# Patient Record
Sex: MALE | Race: WHITE | NOT HISPANIC OR LATINO | URBAN - METROPOLITAN AREA
[De-identification: names, ages, dates, MRNs, and addresses within clinical notes are randomized per-mention and may not be internally consistent; named-entity substitution may affect disease eponyms.]

---

## 2017-01-01 ENCOUNTER — COMMUNICATION - HEALTHEAST (OUTPATIENT)
Dept: CARDIOLOGY | Facility: CLINIC | Age: 72
End: 2017-01-01

## 2017-01-01 ENCOUNTER — AMBULATORY - HEALTHEAST (OUTPATIENT)
Dept: CARDIOLOGY | Facility: CLINIC | Age: 72
End: 2017-01-01

## 2017-01-01 DIAGNOSIS — Z95.0 CARDIAC PACEMAKER IN SITU: ICD-10-CM

## 2017-01-01 LAB
HCC DEVICE COMMENTS: NORMAL
HCC DEVICE COMMENTS: NORMAL

## 2017-05-10 ASSESSMENT — MIFFLIN-ST. JEOR: SCORE: 2057.13

## 2017-05-12 ASSESSMENT — MIFFLIN-ST. JEOR: SCORE: 2047.6

## 2017-05-13 ASSESSMENT — MIFFLIN-ST. JEOR: SCORE: 2032.18

## 2017-05-14 ASSESSMENT — MIFFLIN-ST. JEOR: SCORE: 2023.11

## 2017-05-15 ENCOUNTER — SURGERY - HEALTHEAST (OUTPATIENT)
Dept: CARDIOLOGY | Facility: CLINIC | Age: 72
End: 2017-05-15

## 2017-05-15 ASSESSMENT — MIFFLIN-ST. JEOR: SCORE: 2026.74

## 2017-05-16 ASSESSMENT — MIFFLIN-ST. JEOR
SCORE: 2050.78
SCORE: 2034.45

## 2017-05-19 ENCOUNTER — RECORDS - HEALTHEAST (OUTPATIENT)
Dept: LAB | Facility: CLINIC | Age: 72
End: 2017-05-19

## 2017-05-19 LAB
CHOLEST SERPL-MCNC: 157 MG/DL
FASTING STATUS PATIENT QL REPORTED: ABNORMAL
HDLC SERPL-MCNC: 32 MG/DL
LDLC SERPL CALC-MCNC: 106 MG/DL
TRIGL SERPL-MCNC: 94 MG/DL

## 2017-05-22 ENCOUNTER — AMBULATORY - HEALTHEAST (OUTPATIENT)
Dept: CARDIOLOGY | Facility: CLINIC | Age: 72
End: 2017-05-22

## 2017-05-22 DIAGNOSIS — Z95.0 CARDIAC PACEMAKER IN SITU: ICD-10-CM

## 2017-05-22 LAB — HCC DEVICE COMMENTS: NORMAL

## 2017-05-22 ASSESSMENT — MIFFLIN-ST. JEOR: SCORE: 2049.42

## 2017-06-02 ENCOUNTER — AMBULATORY - HEALTHEAST (OUTPATIENT)
Dept: CARDIOLOGY | Facility: CLINIC | Age: 72
End: 2017-06-02

## 2017-06-02 ENCOUNTER — COMMUNICATION - HEALTHEAST (OUTPATIENT)
Dept: CARDIOLOGY | Facility: CLINIC | Age: 72
End: 2017-06-02

## 2017-06-02 DIAGNOSIS — I48.91 ATRIAL FIBRILLATION (H): ICD-10-CM

## 2017-06-02 DIAGNOSIS — Z95.0 CARDIAC PACEMAKER IN SITU: ICD-10-CM

## 2017-06-03 LAB — HCC DEVICE COMMENTS: NORMAL

## 2017-06-23 ENCOUNTER — AMBULATORY - HEALTHEAST (OUTPATIENT)
Dept: CARDIOLOGY | Facility: CLINIC | Age: 72
End: 2017-06-23

## 2017-06-23 DIAGNOSIS — I49.5 SSS (SICK SINUS SYNDROME) (H): ICD-10-CM

## 2017-06-23 DIAGNOSIS — I48.91 ATRIAL FIBRILLATION WITH RVR (H): ICD-10-CM

## 2017-06-23 DIAGNOSIS — I45.5 SINUS PAUSE: ICD-10-CM

## 2017-06-23 DIAGNOSIS — Z95.0 CARDIAC PACEMAKER IN SITU: ICD-10-CM

## 2017-06-23 DIAGNOSIS — R55 SYNCOPE AND COLLAPSE: ICD-10-CM

## 2017-06-23 LAB — HCC DEVICE COMMENTS: NORMAL

## 2017-06-23 ASSESSMENT — MIFFLIN-ST. JEOR: SCORE: 2058.49

## 2017-07-05 ENCOUNTER — AMBULATORY - HEALTHEAST (OUTPATIENT)
Dept: CARDIOLOGY | Facility: CLINIC | Age: 72
End: 2017-07-05

## 2017-07-05 DIAGNOSIS — Z95.0 CARDIAC PACEMAKER IN SITU: ICD-10-CM

## 2017-07-05 LAB — HCC DEVICE COMMENTS: NORMAL

## 2017-07-25 ENCOUNTER — AMBULATORY - HEALTHEAST (OUTPATIENT)
Dept: CARDIOLOGY | Facility: CLINIC | Age: 72
End: 2017-07-25

## 2017-07-25 DIAGNOSIS — Z95.0 CARDIAC PACEMAKER IN SITU: ICD-10-CM

## 2017-07-25 LAB — HCC DEVICE COMMENTS: NORMAL

## 2017-07-25 ASSESSMENT — MIFFLIN-ST. JEOR: SCORE: 2036.26

## 2017-07-28 ENCOUNTER — OFFICE VISIT - HEALTHEAST (OUTPATIENT)
Dept: SLEEP MEDICINE | Facility: CLINIC | Age: 72
End: 2017-07-28

## 2017-07-28 DIAGNOSIS — G47.8 SLEEP DYSFUNCTION WITH SLEEP STAGE DISTURBANCE: ICD-10-CM

## 2017-07-28 DIAGNOSIS — R06.83 SNORING: ICD-10-CM

## 2017-07-28 DIAGNOSIS — R29.818 SUSPECTED SLEEP APNEA: ICD-10-CM

## 2017-07-28 DIAGNOSIS — G47.10 HYPERSOMNIA, UNSPECIFIED: ICD-10-CM

## 2017-07-28 ASSESSMENT — MIFFLIN-ST. JEOR: SCORE: 2035.81

## 2017-08-15 ENCOUNTER — RECORDS - HEALTHEAST (OUTPATIENT)
Dept: SLEEP MEDICINE | Facility: CLINIC | Age: 72
End: 2017-08-15

## 2017-08-15 ENCOUNTER — RECORDS - HEALTHEAST (OUTPATIENT)
Dept: ADMINISTRATIVE | Facility: OTHER | Age: 72
End: 2017-08-15

## 2017-08-15 DIAGNOSIS — G47.10 HYPERSOMNIA, UNSPECIFIED: ICD-10-CM

## 2017-08-15 DIAGNOSIS — G47.30 SLEEP APNEA, UNSPECIFIED: ICD-10-CM

## 2017-08-15 DIAGNOSIS — R06.83 SNORING: ICD-10-CM

## 2017-08-15 DIAGNOSIS — G47.8 OTHER SLEEP DISORDERS: ICD-10-CM

## 2017-08-16 ENCOUNTER — RECORDS - HEALTHEAST (OUTPATIENT)
Dept: ADMINISTRATIVE | Facility: OTHER | Age: 72
End: 2017-08-16

## 2017-08-22 ENCOUNTER — COMMUNICATION - HEALTHEAST (OUTPATIENT)
Dept: SLEEP MEDICINE | Facility: CLINIC | Age: 72
End: 2017-08-22

## 2017-09-13 ENCOUNTER — OFFICE VISIT - HEALTHEAST (OUTPATIENT)
Dept: SLEEP MEDICINE | Facility: CLINIC | Age: 72
End: 2017-09-13

## 2017-09-13 DIAGNOSIS — G47.8 SLEEP DYSFUNCTION WITH SLEEP STAGE DISTURBANCE: ICD-10-CM

## 2017-09-13 DIAGNOSIS — G47.10 HYPERSOMNIA, UNSPECIFIED: ICD-10-CM

## 2017-09-13 DIAGNOSIS — G47.69 SLEEP-RELATED MOVEMENT DISORDER: ICD-10-CM

## 2017-09-13 DIAGNOSIS — G47.8 UPPER AIRWAY RESISTANCE SYNDROME: ICD-10-CM

## 2017-09-13 ASSESSMENT — MIFFLIN-ST. JEOR: SCORE: 2062.11

## 2017-10-31 ENCOUNTER — AMBULATORY - HEALTHEAST (OUTPATIENT)
Dept: CARDIOLOGY | Facility: CLINIC | Age: 72
End: 2017-10-31

## 2017-10-31 DIAGNOSIS — Z95.0 CARDIAC PACEMAKER IN SITU: ICD-10-CM

## 2017-10-31 LAB — HCC DEVICE COMMENTS: NORMAL

## 2018-01-01 ENCOUNTER — ANESTHESIA - HEALTHEAST (OUTPATIENT)
Dept: INTENSIVE CARE | Facility: HOSPITAL | Age: 73
End: 2018-01-01

## 2018-01-01 ENCOUNTER — OFFICE VISIT - HEALTHEAST (OUTPATIENT)
Dept: GERIATRICS | Facility: CLINIC | Age: 73
End: 2018-01-01

## 2018-01-01 ENCOUNTER — RECORDS - HEALTHEAST (OUTPATIENT)
Dept: LAB | Facility: CLINIC | Age: 73
End: 2018-01-01

## 2018-01-01 ENCOUNTER — AMBULATORY - HEALTHEAST (OUTPATIENT)
Dept: CARDIOLOGY | Facility: CLINIC | Age: 73
End: 2018-01-01

## 2018-01-01 ENCOUNTER — SURGERY - HEALTHEAST (OUTPATIENT)
Dept: GASTROENTEROLOGY | Facility: HOSPITAL | Age: 73
End: 2018-01-01

## 2018-01-01 ENCOUNTER — SURGERY - HEALTHEAST (OUTPATIENT)
Dept: SURGERY | Facility: CLINIC | Age: 73
End: 2018-01-01

## 2018-01-01 ENCOUNTER — RECORDS - HEALTHEAST (OUTPATIENT)
Dept: ADMINISTRATIVE | Facility: OTHER | Age: 73
End: 2018-01-01

## 2018-01-01 ENCOUNTER — HOME CARE/HOSPICE - HEALTHEAST (OUTPATIENT)
Dept: HOME HEALTH SERVICES | Facility: HOME HEALTH | Age: 73
End: 2018-01-01

## 2018-01-01 ENCOUNTER — COMMUNICATION - HEALTHEAST (OUTPATIENT)
Dept: GERIATRICS | Facility: CLINIC | Age: 73
End: 2018-01-01

## 2018-01-01 ENCOUNTER — ANESTHESIA - HEALTHEAST (OUTPATIENT)
Dept: SURGERY | Facility: CLINIC | Age: 73
End: 2018-01-01

## 2018-01-01 ENCOUNTER — ANESTHESIA - HEALTHEAST (OUTPATIENT)
Dept: SURGERY | Facility: HOSPITAL | Age: 73
End: 2018-01-01

## 2018-01-01 ENCOUNTER — RECORDS - HEALTHEAST (OUTPATIENT)
Dept: LAB | Facility: HOSPITAL | Age: 73
End: 2018-01-01

## 2018-01-01 ENCOUNTER — COMMUNICATION - HEALTHEAST (OUTPATIENT)
Dept: CARDIOLOGY | Facility: CLINIC | Age: 73
End: 2018-01-01

## 2018-01-01 ENCOUNTER — AMBULATORY - HEALTHEAST (OUTPATIENT)
Dept: SURGERY | Facility: CLINIC | Age: 73
End: 2018-01-01

## 2018-01-01 ENCOUNTER — RECORDS - HEALTHEAST (OUTPATIENT)
Dept: INTENSIVE CARE | Facility: HOSPITAL | Age: 73
End: 2018-01-01

## 2018-01-01 ENCOUNTER — SURGERY - HEALTHEAST (OUTPATIENT)
Dept: SURGERY | Facility: HOSPITAL | Age: 73
End: 2018-01-01

## 2018-01-01 ENCOUNTER — AMBULATORY - HEALTHEAST (OUTPATIENT)
Dept: GERIATRICS | Facility: CLINIC | Age: 73
End: 2018-01-01

## 2018-01-01 DIAGNOSIS — Z87.81 S/P ORIF (OPEN REDUCTION INTERNAL FIXATION) FRACTURE: ICD-10-CM

## 2018-01-01 DIAGNOSIS — I89.0 LYMPHEDEMA: ICD-10-CM

## 2018-01-01 DIAGNOSIS — Z95.0 CARDIAC PACEMAKER IN SITU: ICD-10-CM

## 2018-01-01 DIAGNOSIS — Z98.890 S/P ORIF (OPEN REDUCTION INTERNAL FIXATION) FRACTURE: ICD-10-CM

## 2018-01-01 DIAGNOSIS — I48.91 ATRIAL FIBRILLATION (H): ICD-10-CM

## 2018-01-01 DIAGNOSIS — R52 PAIN MANAGEMENT: ICD-10-CM

## 2018-01-01 DIAGNOSIS — S42.309A HUMERAL FRACTURE: ICD-10-CM

## 2018-01-01 DIAGNOSIS — R00.1 SINUS BRADYCARDIA: ICD-10-CM

## 2018-01-01 DIAGNOSIS — N17.9 ACUTE KIDNEY INJURY (H): ICD-10-CM

## 2018-01-01 DIAGNOSIS — T14.8XXA WOUND INFECTION: ICD-10-CM

## 2018-01-01 DIAGNOSIS — K76.82 HEPATIC ENCEPHALOPATHY (H): ICD-10-CM

## 2018-01-01 DIAGNOSIS — I48.91 ATRIAL FIBRILLATION WITH RVR (H): ICD-10-CM

## 2018-01-01 DIAGNOSIS — E11.9 DIABETES MELLITUS (H): ICD-10-CM

## 2018-01-01 DIAGNOSIS — I10 HYPERTENSION: ICD-10-CM

## 2018-01-01 DIAGNOSIS — D50.0 BLOOD LOSS ANEMIA: ICD-10-CM

## 2018-01-01 DIAGNOSIS — E66.9 OBESE: ICD-10-CM

## 2018-01-01 DIAGNOSIS — I50.9 CHF (CONGESTIVE HEART FAILURE) (H): ICD-10-CM

## 2018-01-01 DIAGNOSIS — L08.9 WOUND INFECTION: ICD-10-CM

## 2018-01-01 DIAGNOSIS — T14.8XXA WOUND DRAINAGE: ICD-10-CM

## 2018-01-01 DIAGNOSIS — I49.5 SSS (SICK SINUS SYNDROME) (H): ICD-10-CM

## 2018-01-01 DIAGNOSIS — R10.31 RIGHT GROIN PAIN: ICD-10-CM

## 2018-01-01 DIAGNOSIS — I49.5 TACHY-BRADY SYNDROME (H): ICD-10-CM

## 2018-01-01 DIAGNOSIS — S42.201A FRACTURE OF PROXIMAL END OF RIGHT HUMERUS: ICD-10-CM

## 2018-01-01 LAB
ALBUMIN SERPL-MCNC: 3.1 G/DL (ref 3.5–5)
ALP SERPL-CCNC: 170 U/L (ref 45–120)
ALT SERPL W P-5'-P-CCNC: 25 U/L (ref 0–45)
ANION GAP SERPL CALCULATED.3IONS-SCNC: 11 MMOL/L (ref 5–18)
ANION GAP SERPL CALCULATED.3IONS-SCNC: 14 MMOL/L (ref 5–18)
ANION GAP SERPL CALCULATED.3IONS-SCNC: 5 MMOL/L (ref 5–18)
ANION GAP SERPL CALCULATED.3IONS-SCNC: 6 MMOL/L (ref 5–18)
ANION GAP SERPL CALCULATED.3IONS-SCNC: 6 MMOL/L (ref 5–18)
ANION GAP SERPL CALCULATED.3IONS-SCNC: 7 MMOL/L (ref 5–18)
ANION GAP SERPL CALCULATED.3IONS-SCNC: 7 MMOL/L (ref 5–18)
ANION GAP SERPL CALCULATED.3IONS-SCNC: 8 MMOL/L (ref 5–18)
ANION GAP SERPL CALCULATED.3IONS-SCNC: 8 MMOL/L (ref 5–18)
AST SERPL W P-5'-P-CCNC: 51 U/L (ref 0–40)
ATRIAL RATE - MUSE: 66 BPM
BACTERIA SPEC CULT: ABNORMAL
BACTERIA SPEC CULT: ABNORMAL
BASOPHILS # BLD AUTO: 0 THOU/UL (ref 0–0.2)
BASOPHILS NFR BLD AUTO: 0 % (ref 0–2)
BASOPHILS NFR BLD AUTO: 1 % (ref 0–2)
BASOPHILS NFR BLD AUTO: 1 % (ref 0–2)
BILIRUB SERPL-MCNC: 2.2 MG/DL (ref 0–1)
BUN SERPL-MCNC: 12 MG/DL (ref 8–28)
BUN SERPL-MCNC: 14 MG/DL (ref 8–28)
BUN SERPL-MCNC: 14 MG/DL (ref 8–28)
BUN SERPL-MCNC: 15 MG/DL (ref 8–28)
BUN SERPL-MCNC: 15 MG/DL (ref 8–28)
BUN SERPL-MCNC: 17 MG/DL (ref 8–28)
BUN SERPL-MCNC: 17 MG/DL (ref 8–28)
BUN SERPL-MCNC: 19 MG/DL (ref 8–28)
BUN SERPL-MCNC: 19 MG/DL (ref 8–28)
C REACTIVE PROTEIN LHE: 0.5 MG/DL (ref 0–0.8)
C REACTIVE PROTEIN LHE: 1.3 MG/DL (ref 0–0.8)
CALCIUM SERPL-MCNC: 8.7 MG/DL (ref 8.5–10.5)
CALCIUM SERPL-MCNC: 8.7 MG/DL (ref 8.5–10.5)
CALCIUM SERPL-MCNC: 8.8 MG/DL (ref 8.5–10.5)
CALCIUM SERPL-MCNC: 8.9 MG/DL (ref 8.5–10.5)
CALCIUM SERPL-MCNC: 9.1 MG/DL (ref 8.5–10.5)
CALCIUM SERPL-MCNC: 9.4 MG/DL (ref 8.5–10.5)
CALCIUM SERPL-MCNC: 9.5 MG/DL (ref 8.5–10.5)
CHLORIDE BLD-SCNC: 101 MMOL/L (ref 98–107)
CHLORIDE BLD-SCNC: 103 MMOL/L (ref 98–107)
CHLORIDE BLD-SCNC: 104 MMOL/L (ref 98–107)
CHLORIDE BLD-SCNC: 104 MMOL/L (ref 98–107)
CHLORIDE BLD-SCNC: 105 MMOL/L (ref 98–107)
CHLORIDE BLD-SCNC: 105 MMOL/L (ref 98–107)
CHLORIDE BLD-SCNC: 107 MMOL/L (ref 98–107)
CHLORIDE BLD-SCNC: 111 MMOL/L (ref 98–107)
CHLORIDE BLD-SCNC: 99 MMOL/L (ref 98–107)
CO2 SERPL-SCNC: 17 MMOL/L (ref 22–31)
CO2 SERPL-SCNC: 24 MMOL/L (ref 22–31)
CO2 SERPL-SCNC: 26 MMOL/L (ref 22–31)
CO2 SERPL-SCNC: 28 MMOL/L (ref 22–31)
CO2 SERPL-SCNC: 29 MMOL/L (ref 22–31)
CO2 SERPL-SCNC: 30 MMOL/L (ref 22–31)
CO2 SERPL-SCNC: 31 MMOL/L (ref 22–31)
CREAT SERPL-MCNC: 0.68 MG/DL (ref 0.7–1.3)
CREAT SERPL-MCNC: 0.73 MG/DL (ref 0.7–1.3)
CREAT SERPL-MCNC: 0.76 MG/DL (ref 0.7–1.3)
CREAT SERPL-MCNC: 0.76 MG/DL (ref 0.7–1.3)
CREAT SERPL-MCNC: 0.81 MG/DL (ref 0.7–1.3)
CREAT SERPL-MCNC: 0.83 MG/DL (ref 0.7–1.3)
CREAT SERPL-MCNC: 0.83 MG/DL (ref 0.7–1.3)
CREAT SERPL-MCNC: 0.89 MG/DL (ref 0.7–1.3)
CREAT SERPL-MCNC: 1.12 MG/DL (ref 0.7–1.3)
DIASTOLIC BLOOD PRESSURE - MUSE: NORMAL MMHG
EOSINOPHIL # BLD AUTO: 0.2 THOU/UL (ref 0–0.4)
EOSINOPHIL # BLD AUTO: 0.3 THOU/UL (ref 0–0.4)
EOSINOPHIL # BLD AUTO: 0.4 THOU/UL (ref 0–0.4)
EOSINOPHIL NFR BLD AUTO: 3 % (ref 0–6)
EOSINOPHIL NFR BLD AUTO: 4 % (ref 0–6)
EOSINOPHIL NFR BLD AUTO: 6 % (ref 0–6)
ERYTHROCYTE [DISTWIDTH] IN BLOOD BY AUTOMATED COUNT: 14.8 % (ref 11–14.5)
ERYTHROCYTE [DISTWIDTH] IN BLOOD BY AUTOMATED COUNT: 14.9 % (ref 11–14.5)
ERYTHROCYTE [DISTWIDTH] IN BLOOD BY AUTOMATED COUNT: 14.9 % (ref 11–14.5)
ERYTHROCYTE [DISTWIDTH] IN BLOOD BY AUTOMATED COUNT: 15 % (ref 11–14.5)
ERYTHROCYTE [DISTWIDTH] IN BLOOD BY AUTOMATED COUNT: 15.3 % (ref 11–14.5)
ERYTHROCYTE [DISTWIDTH] IN BLOOD BY AUTOMATED COUNT: 15.4 % (ref 11–14.5)
ERYTHROCYTE [DISTWIDTH] IN BLOOD BY AUTOMATED COUNT: 15.5 % (ref 11–14.5)
ERYTHROCYTE [SEDIMENTATION RATE] IN BLOOD BY WESTERGREN METHOD: 2 MM/HR (ref 0–15)
ERYTHROCYTE [SEDIMENTATION RATE] IN BLOOD BY WESTERGREN METHOD: 4 MM/HR (ref 0–15)
GFR SERPL CREATININE-BSD FRML MDRD: >60 ML/MIN/1.73M2
GLUCOSE BLD-MCNC: 105 MG/DL (ref 70–125)
GLUCOSE BLD-MCNC: 166 MG/DL (ref 70–125)
GLUCOSE BLD-MCNC: 167 MG/DL (ref 70–125)
GLUCOSE BLD-MCNC: 197 MG/DL (ref 70–125)
GLUCOSE BLD-MCNC: 82 MG/DL (ref 70–125)
GLUCOSE BLD-MCNC: 84 MG/DL (ref 70–125)
GLUCOSE BLD-MCNC: 87 MG/DL (ref 70–125)
GLUCOSE BLD-MCNC: 95 MG/DL (ref 70–125)
GLUCOSE BLD-MCNC: 98 MG/DL (ref 70–125)
HCC DEVICE COMMENTS: NORMAL
HCC DEVICE IMPLANTING PROVIDER: NORMAL
HCC DEVICE MANUFACTURE: NORMAL
HCC DEVICE MODEL: NORMAL
HCC DEVICE SERIAL NUMBER: NORMAL
HCC DEVICE TYPE: NORMAL
HCT VFR BLD AUTO: 24.4 % (ref 40–54)
HCT VFR BLD AUTO: 24.7 % (ref 40–54)
HCT VFR BLD AUTO: 31 % (ref 40–54)
HCT VFR BLD AUTO: 32.6 % (ref 40–54)
HCT VFR BLD AUTO: 33.1 % (ref 40–54)
HCT VFR BLD AUTO: 40.2 % (ref 40–54)
HCT VFR BLD AUTO: 41.1 % (ref 40–54)
HGB BLD-MCNC: 10.3 G/DL (ref 14–18)
HGB BLD-MCNC: 10.5 G/DL (ref 14–18)
HGB BLD-MCNC: 10.7 G/DL (ref 14–18)
HGB BLD-MCNC: 10.9 G/DL (ref 14–18)
HGB BLD-MCNC: 13.7 G/DL (ref 14–18)
HGB BLD-MCNC: 13.8 G/DL (ref 14–18)
HGB BLD-MCNC: 7.6 G/DL (ref 14–18)
HGB BLD-MCNC: 7.7 G/DL (ref 14–18)
HGB BLD-MCNC: 7.7 G/DL (ref 14–18)
HGB BLD-MCNC: 8.1 G/DL (ref 14–18)
HGB BLD-MCNC: 8.2 G/DL (ref 14–18)
HGB BLD-MCNC: 8.4 G/DL (ref 14–18)
INR PPP: 1.58 (ref 0.9–1.1)
INTERPRETATION ECG - MUSE: NORMAL
LYMPHOCYTES # BLD AUTO: 1.2 THOU/UL (ref 0.8–4.4)
LYMPHOCYTES # BLD AUTO: 1.8 THOU/UL (ref 0.8–4.4)
LYMPHOCYTES # BLD AUTO: 1.9 THOU/UL (ref 0.8–4.4)
LYMPHOCYTES NFR BLD AUTO: 21 % (ref 20–40)
LYMPHOCYTES NFR BLD AUTO: 26 % (ref 20–40)
LYMPHOCYTES NFR BLD AUTO: 29 % (ref 20–40)
MAGNESIUM SERPL-MCNC: 1.5 MG/DL (ref 1.8–2.6)
MAGNESIUM SERPL-MCNC: 1.8 MG/DL (ref 1.8–2.6)
MCH RBC QN AUTO: 33.5 PG (ref 27–34)
MCH RBC QN AUTO: 33.8 PG (ref 27–34)
MCH RBC QN AUTO: 34 PG (ref 27–34)
MCH RBC QN AUTO: 34 PG (ref 27–34)
MCH RBC QN AUTO: 34.8 PG (ref 27–34)
MCH RBC QN AUTO: 35 PG (ref 27–34)
MCH RBC QN AUTO: 35.3 PG (ref 27–34)
MCHC RBC AUTO-ENTMCNC: 31.2 G/DL (ref 32–36)
MCHC RBC AUTO-ENTMCNC: 31.6 G/DL (ref 32–36)
MCHC RBC AUTO-ENTMCNC: 31.7 G/DL (ref 32–36)
MCHC RBC AUTO-ENTMCNC: 33.2 G/DL (ref 32–36)
MCHC RBC AUTO-ENTMCNC: 33.4 G/DL (ref 32–36)
MCHC RBC AUTO-ENTMCNC: 33.6 G/DL (ref 32–36)
MCHC RBC AUTO-ENTMCNC: 34.1 G/DL (ref 32–36)
MCV RBC AUTO: 101 FL (ref 80–100)
MCV RBC AUTO: 103 FL (ref 80–100)
MCV RBC AUTO: 105 FL (ref 80–100)
MCV RBC AUTO: 106 FL (ref 80–100)
MCV RBC AUTO: 106 FL (ref 80–100)
MCV RBC AUTO: 107 FL (ref 80–100)
MCV RBC AUTO: 108 FL (ref 80–100)
MONOCYTES # BLD AUTO: 1 THOU/UL (ref 0–0.9)
MONOCYTES # BLD AUTO: 1 THOU/UL (ref 0–0.9)
MONOCYTES # BLD AUTO: 1.1 THOU/UL (ref 0–0.9)
MONOCYTES NFR BLD AUTO: 14 % (ref 2–10)
MONOCYTES NFR BLD AUTO: 16 % (ref 2–10)
MONOCYTES NFR BLD AUTO: 18 % (ref 2–10)
NEUTROPHILS # BLD AUTO: 3.1 THOU/UL (ref 2–7.7)
NEUTROPHILS # BLD AUTO: 3.3 THOU/UL (ref 2–7.7)
NEUTROPHILS # BLD AUTO: 3.8 THOU/UL (ref 2–7.7)
NEUTROPHILS NFR BLD AUTO: 50 % (ref 50–70)
NEUTROPHILS NFR BLD AUTO: 54 % (ref 50–70)
NEUTROPHILS NFR BLD AUTO: 58 % (ref 50–70)
P AXIS - MUSE: -17 DEGREES
PHOSPHATE SERPL-MCNC: 4.3 MG/DL (ref 2.5–4.5)
PLATELET # BLD AUTO: 111 THOU/UL (ref 140–440)
PLATELET # BLD AUTO: 112 THOU/UL (ref 140–440)
PLATELET # BLD AUTO: 125 THOU/UL (ref 140–440)
PLATELET # BLD AUTO: 125 THOU/UL (ref 140–440)
PLATELET # BLD AUTO: 127 THOU/UL (ref 140–440)
PLATELET # BLD AUTO: 151 THOU/UL (ref 140–440)
PLATELET # BLD AUTO: 91 THOU/UL (ref 140–440)
PMV BLD AUTO: 10.2 FL (ref 8.5–12.5)
PMV BLD AUTO: 10.4 FL (ref 8.5–12.5)
PMV BLD AUTO: 10.6 FL (ref 8.5–12.5)
PMV BLD AUTO: 10.6 FL (ref 8.5–12.5)
PMV BLD AUTO: 11.1 FL (ref 8.5–12.5)
PMV BLD AUTO: 11.1 FL (ref 8.5–12.5)
PMV BLD AUTO: 9.9 FL (ref 8.5–12.5)
POTASSIUM BLD-SCNC: 3.8 MMOL/L (ref 3.5–5)
POTASSIUM BLD-SCNC: 4 MMOL/L (ref 3.5–5)
POTASSIUM BLD-SCNC: 4.1 MMOL/L (ref 3.5–5)
POTASSIUM BLD-SCNC: 4.1 MMOL/L (ref 3.5–5)
POTASSIUM BLD-SCNC: 4.2 MMOL/L (ref 3.5–5)
POTASSIUM BLD-SCNC: 4.3 MMOL/L (ref 3.5–5)
POTASSIUM BLD-SCNC: 4.4 MMOL/L (ref 3.5–5)
POTASSIUM BLD-SCNC: 4.5 MMOL/L (ref 3.5–5)
POTASSIUM BLD-SCNC: 4.6 MMOL/L (ref 3.5–5)
POTASSIUM BLD-SCNC: ABNORMAL MMOL/L (ref 3.5–5)
PR INTERVAL - MUSE: 192 MS
PROT SERPL-MCNC: 6.7 G/DL (ref 6–8)
QRS DURATION - MUSE: 78 MS
QT - MUSE: 434 MS
QTC - MUSE: 454 MS
R AXIS - MUSE: 7 DEGREES
RBC # BLD AUTO: 2.28 MILL/UL (ref 4.4–6.2)
RBC # BLD AUTO: 2.3 MILL/UL (ref 4.4–6.2)
RBC # BLD AUTO: 2.96 MILL/UL (ref 4.4–6.2)
RBC # BLD AUTO: 3.09 MILL/UL (ref 4.4–6.2)
RBC # BLD AUTO: 3.09 MILL/UL (ref 4.4–6.2)
RBC # BLD AUTO: 3.91 MILL/UL (ref 4.4–6.2)
RBC # BLD AUTO: 4.06 MILL/UL (ref 4.4–6.2)
SODIUM SERPL-SCNC: 135 MMOL/L (ref 136–145)
SODIUM SERPL-SCNC: 136 MMOL/L (ref 136–145)
SODIUM SERPL-SCNC: 137 MMOL/L (ref 136–145)
SODIUM SERPL-SCNC: 138 MMOL/L (ref 136–145)
SODIUM SERPL-SCNC: 139 MMOL/L (ref 136–145)
SODIUM SERPL-SCNC: 139 MMOL/L (ref 136–145)
SODIUM SERPL-SCNC: 140 MMOL/L (ref 136–145)
SODIUM SERPL-SCNC: 142 MMOL/L (ref 136–145)
SODIUM SERPL-SCNC: 142 MMOL/L (ref 136–145)
SYSTOLIC BLOOD PRESSURE - MUSE: NORMAL MMHG
T AXIS - MUSE: 9 DEGREES
VENTRICULAR RATE- MUSE: 66 BPM
WBC: 5.4 THOU/UL (ref 4–11)
WBC: 6.1 THOU/UL (ref 4–11)
WBC: 6.3 THOU/UL (ref 4–11)
WBC: 6.4 THOU/UL (ref 4–11)
WBC: 6.6 THOU/UL (ref 4–11)
WBC: 7.1 THOU/UL (ref 4–11)
WBC: 8.8 THOU/UL (ref 4–11)

## 2018-01-01 RX ORDER — SOTALOL HYDROCHLORIDE 120 MG/1
120 TABLET ORAL 2 TIMES DAILY
Qty: 180 TABLET | Refills: 5 | Status: SHIPPED | OUTPATIENT
Start: 2018-01-01

## 2018-01-01 ASSESSMENT — MIFFLIN-ST. JEOR
SCORE: 2091.13
SCORE: 2119.16
SCORE: 2150.13
SCORE: 2150.13
SCORE: 2102.94
SCORE: 2076.63
SCORE: 2133.13
SCORE: 2020.21
SCORE: 2096.13
SCORE: 2070.73
SCORE: 2114.91
SCORE: 2115.18
SCORE: 2091.13
SCORE: 2102.94
SCORE: 2133.13
SCORE: 2114.91
SCORE: 2096.13
SCORE: 2096.13
SCORE: 2099.31
SCORE: 2099.31
SCORE: 2102.94
SCORE: 2091.13
SCORE: 2076.63
SCORE: 2076.63
SCORE: 2133.13
SCORE: 2099.31
SCORE: 2150.13

## 2021-05-25 ENCOUNTER — RECORDS - HEALTHEAST (OUTPATIENT)
Dept: ADMINISTRATIVE | Facility: CLINIC | Age: 76
End: 2021-05-25

## 2021-05-28 ENCOUNTER — RECORDS - HEALTHEAST (OUTPATIENT)
Dept: ADMINISTRATIVE | Facility: CLINIC | Age: 76
End: 2021-05-28

## 2021-05-29 ENCOUNTER — RECORDS - HEALTHEAST (OUTPATIENT)
Dept: ADMINISTRATIVE | Facility: CLINIC | Age: 76
End: 2021-05-29

## 2021-05-31 VITALS — HEIGHT: 70 IN | WEIGHT: 286.5 LBS | BODY MASS INDEX: 41.01 KG/M2

## 2021-05-31 VITALS — HEIGHT: 70 IN | BODY MASS INDEX: 41.84 KG/M2 | WEIGHT: 292.3 LBS

## 2021-05-31 VITALS — HEIGHT: 70 IN | BODY MASS INDEX: 41.03 KG/M2 | WEIGHT: 286.6 LBS

## 2021-05-31 VITALS — WEIGHT: 288 LBS | HEIGHT: 71 IN | BODY MASS INDEX: 40.32 KG/M2

## 2021-05-31 VITALS — BODY MASS INDEX: 41.11 KG/M2 | HEIGHT: 70 IN | BODY MASS INDEX: 41.04 KG/M2 | WEIGHT: 286 LBS

## 2021-05-31 VITALS — BODY MASS INDEX: 39.58 KG/M2 | WEIGHT: 282.7 LBS | HEIGHT: 71 IN

## 2021-05-31 VITALS — WEIGHT: 286 LBS | HEIGHT: 71 IN | BODY MASS INDEX: 40.04 KG/M2

## 2021-06-01 VITALS — HEIGHT: 70 IN | BODY MASS INDEX: 40.52 KG/M2 | WEIGHT: 283.06 LBS

## 2021-06-01 VITALS — BODY MASS INDEX: 43.52 KG/M2 | WEIGHT: 304 LBS | HEIGHT: 70 IN

## 2021-06-01 VITALS — HEIGHT: 70 IN | WEIGHT: 294.2 LBS | BODY MASS INDEX: 42.12 KG/M2

## 2021-06-02 ENCOUNTER — RECORDS - HEALTHEAST (OUTPATIENT)
Dept: ADMINISTRATIVE | Facility: CLINIC | Age: 76
End: 2021-06-02

## 2021-06-02 VITALS
WEIGHT: 296.3 LBS | HEIGHT: 71 IN | BODY MASS INDEX: 41.48 KG/M2 | HEIGHT: 71 IN | BODY MASS INDEX: 41.48 KG/M2 | BODY MASS INDEX: 41.48 KG/M2 | WEIGHT: 296.3 LBS | WEIGHT: 296.3 LBS | HEIGHT: 71 IN

## 2021-06-02 VITALS — WEIGHT: 301.38 LBS | BODY MASS INDEX: 42.19 KG/M2 | HEIGHT: 71 IN

## 2021-06-02 VITALS — HEIGHT: 71 IN | WEIGHT: 300.44 LBS | BODY MASS INDEX: 42.06 KG/M2

## 2021-06-09 ENCOUNTER — RECORDS - HEALTHEAST (OUTPATIENT)
Dept: ADMINISTRATIVE | Facility: CLINIC | Age: 76
End: 2021-06-09

## 2021-06-10 NOTE — PROGRESS NOTES
DEVICE CLINIC RN POST-OP NOTE    Reason for visit: 1 week post op pacemaker and wound check.      Device: [employer] L331 ACCOLADE MRI EL  Procedure date: 5/15/2017  Implant Diagnosis: Atrial fibrillation, Sick Sinus syndrome  Implanting Physician: Dr. Daniele Grace      Assessment  Subjective: Jairon reports he is doing well with minimal soreness at his device site. He is using tylenol and ice packs occasionally.  Vitals:   Vitals:    05/22/17 1001   BP: 112/64   Pulse: 64   Resp: 20   Temp: 98  F (36.7  C)     Heart Sounds: normal  Lung Sounds: clear to auscultation bilaterally  Incision/device pocket: The steri-strips were removed from the incision and it was cleansed with adhesive remover and alcohol wipes. The incision is clean, dry and intact. There are no signs of infection present. The incision is well healed.        Device Findings  Interrogation of device reveals normal sensing and capture thresholds  See the P[employer]art Report for a full summary of the device information.        Patient Education  Jairon Gomez was accompanied today by his spouse.      [facility]'s Partnership Agreement for Device Patients and Post-operative Checklist were presented and reviewed with patient at today's appointment.    Signs and symptoms of infection, poor wound healing, and device function were reviewed. Range of motion and weight restrictions for the LEFT side are for four weeks. He was issued a [employer] NXT remote monitor and instructed on its set-up and use for remote monitoring by the [employer] representative prior to hospital discharge. These instructions were reviewed with the patient at today s visit.       Plan  Remote from home in 1 month on June 22, 2017  In clinic device check in 3 months on August 28, 2017    Bethanie Lewis RN BSN PHN  Device Nurse   [facility]

## 2021-06-11 NOTE — PROGRESS NOTES
In clinic device check with Dr. Grace.  Please see link for full device report.  Patient was informed of results and next follow up during today's visit.

## 2021-06-11 NOTE — PROGRESS NOTES
[facility] Heart Care Note    Assessment:   Recurrent abrupt syncope  Tachybradycardia syndrome        tachycardia manifested as atrial fibrillation with elevated ventricular response         bradycardias manifest as sick sinus syndrome with episodes of sinus arrest  Dual-chamber [employer] pacemaker implantation May 15, 2017   Preserved left ventricular function  Chronic hypertension  Obesity  Sleep disorder likely sleep apnea    Plan:  Will arrange for a sleep study; you likely have some form of sleep apnea and would benefit from treatment  Watch pacemaker site closely.  I do not find evidence for infection but this pacemaker site seems a little more tender than would be expected  Continue current medications  Stop metoprolol; not necessary while taking sotalol  Return in 1 month for wound check    Subjective:    I had the opportunity to see.Jairon Gomez , who is a 71 y.o. male with a known history of tachybradycardia syndrome  He has had no syncope since we placed the pacemaker  He has no awareness of palpitations, does not feel sensations of atrial fibrillation.  Interrogation of his device shows that he has had 2 lengthy episodes of atrial fibrillation.  On May 31 had 11.5 hours spell and on June 14 8.5 hours.  During atrial fibrillation his heart rate is well controlled and he is asymptomatic  He has noted tenderness at his pacemaker site, no particular erythema swelling or signs of fluid or discharge  He has had no signs of systemic illness no fevers chills  He never feels palpitations  He has no chest pain, no excessive breathlessness  During his last hospitalization, the nurses noted that he had obstructive sleep apnea pattern and he has been referred advised to have a sleep study but is never had one done      Problem List:  Patient Active Problem List   Diagnosis     Type 2 diabetes mellitus with hyperglycemia, without long-term current use of insulin     Hypomagnesemia     Gout     Obese      Alcoholic cirrhosis of liver without ascites     Fall at home     Sinus bradycardia     Essential hypertension     Atrial fibrillation with RVR     Syncope and collapse     Sinus pause     SSS (sick sinus syndrome)     Cardiac pacemaker in situ     Medical History:  Past Medical History:   Diagnosis Date     Alcohol abuse, in remission      Back pain     5 spurs in back     Diabetes      Essential hypertension      Gastric ulcer 9/12/2015     GERD (gastroesophageal reflux disease)      Obesity      Osteoarthritis      Prostate cancer      Psoriasis      Skull fracture and subdural hematoma 2002    as a result of loss of consciousness and fall [place] the [place]; negative workup during one week hospital stay with separate neuro and EP workup in [place] after return     Surgical History:  Past Surgical History:   Procedure Laterality Date     BACK SURGERY  2011    for herniated disc, L3-L4     ESOPHAGOGASTRODUODENOSCOPY N/A 9/9/2015    Procedure: ESOPHAGOGASTRODUODENOSCOPY;  Surgeon: Karson Bauman MD;  Location: [facility];  Service:      MI EDG US EXAM SURGICAL ALTER STOM DUODENUM/JEJUNUM N/A 11/16/2015    Procedure: ESOPHAGOGASTRODUODENOSCOPY/ENDOSCOPIC ULTRASOUND;  Surgeon: Juan Diego Gonzalez MD;  Location: [facility];  Service: Gastroenterology     PROSTATECTOMY  1/26/2001    with bilateral lymph node dissection     Social History:  Social History     Social History     Marital status:      Spouse name: Cecilia     Number of children: N/A     Years of education: N/A     Occupational History     Not on file.     Social History Main Topics     Smoking status: Former Smoker     Smokeless tobacco: Not on file     Alcohol use Yes      Comment: Maybe once or twice a month     Drug use: No     Sexual activity: Not on file     Other Topics Concern     Not on file     Social History Narrative       Review of Systems:      General: WNL  Eyes: WNL  Ears/Nose/Throat: WNL  Lungs: WNL  Heart: WNL  Stomach:  WNL  Bladder: WNL  Muscle/Joints: WNL  Skin: WNL  Nervous System: WNL  Mental Health: WNL     Blood: WNL        Family History:  Family History   Problem Relation Age of Onset     Hepatitis Sister      Hepatitis C     Diabetes Brother      Heart disease Brother      Heart disease Brother      Heart disease Brother      Cancer Sister      Diabetes Sister      Alzheimer's disease Mother      Stroke Father 76         Allergies:  No Known Allergies    Medications:  Current Outpatient Prescriptions   Medication Sig Dispense Refill     acetaminophen (TYLENOL) 500 MG tablet Take 1 tablet (500 mg total) by mouth every 6 (six) hours as needed for pain or fever. 30 tablet 0     allopurinol (ZYLOPRIM) 300 MG tablet Take 300 mg by mouth daily.        ascorbic acid, vitamin C, (ASCORBIC ACID WITH CINDI HIPS) 500 MG tablet Take 500 mg by mouth daily.       aspirin 81 mg chewable tablet Chew 1 tablet (81 mg total) daily. 30 tablet 0     furosemide (LASIX) 20 MG tablet Take 20 mg by mouth daily.       lactulose 20 gram/30 mL Soln solution Take 10 g by mouth 3 (three) times a day as needed (titrate up to diarrhea).        lisinopril (PRINIVIL,ZESTRIL) 10 MG tablet Take 2 tablets (20 mg total) by mouth daily. 60 tablet 0     magnesium oxide (MAG-OX) 400 mg tablet Take 400 mg by mouth 2 (two) times a day.       metoprolol tartrate (LOPRESSOR) 25 MG tablet Take 25 mg by mouth 2 (two) times a day.        multivitamin therapeutic (THERAGRAN) tablet Take 1 tablet by mouth daily.       omeprazole (PRILOSEC) 20 MG capsule Take 1 capsule (20 mg total) by mouth Daily before breakfast. 30 capsule 0     ONETOUCH ULTRA TEST strips        rivaroxaban 20 mg Tab Take 1 tablet (20 mg total) by mouth daily with supper. 90 tablet 3     SALMON OIL/OMEGA-3 FATTY ACIDS (SALMON OIL-1000 ORAL) Take 1 capsule by mouth daily.        sotalol (BETAPACE) 80 MG tablet Take 1 tablet (80 mg total) by mouth every 12 (twelve) hours. 60 tablet 0     No current  "facility-administered medications for this visit.          Surgical site  Pacemaker appears to be quite well-healed left subclavicular site.  Incision is intact and there is no discharge no evidence of induration or fluctuance  There may be a slight amount of erythema along the incision    Device interrogation  The intrinsic rhythm is sinus rhythm with intact AV conduction at 55 bpm  44% atrial pacing almost no ventricular pacing  No ventricular tachycardia  Atrial fibrillation present to long episodes-see above; 3% atrial fib burden with rare heart rates above 120 bpm  Lead function is satisfactory  Battery voltage good for another 15 years  He tells me his home monitor has been working fine    Objective:   Vital signs:  /60 (Patient Site: Left Arm, Patient Position: Sitting, Cuff Size: Adult Large)  Pulse 60  Resp 18  Ht 5' 11\" (1.803 m)  Wt (!) 288 lb (130.6 kg)  BMI 40.17 kg/m2      Physical Exam:      GENERAL APPEARANCE: Alert, cooperative and in no acute distress.  HEENT: No scleral icterus. No Xanthelasma. Oral mucous membranes pink and moist.  NECK: JVP  Normal cm. No Hepatojugular reflux. Thyroid not  Palpable  CHEST: clear to auscultation and percussion  CARDIOVASCULAR: S1, S2 without murmur    Brachial, radial  pulses are intact and symmetric.   No carotid bruits noted.  ABDOMEN: Non tender. BS+. No bruits.  EXTREMITIES: No cyanosis, clubbing or edema.    Lab Results:  LIPIDS:  Lab Results   Component Value Date    CHOL 157 05/19/2017    CHOL 148 05/11/2016    CHOL 130 06/15/2015     Lab Results   Component Value Date    HDL 32 (L) 05/19/2017    HDL 43 05/11/2016    HDL 34 (L) 06/15/2015     Lab Results   Component Value Date    LDLCALC 106 05/19/2017    LDLCALC 88 05/11/2016    LDLCALC 77 06/15/2015     Lab Results   Component Value Date    TRIG 94 05/19/2017    TRIG 84 05/11/2016    TRIG 96 06/15/2015     No components found for: CHOLHDL    BMP:  Lab Results   Component Value Date    " CREATININE 0.89 05/15/2017    BUN 12 05/11/2017     05/11/2017    K 3.9 05/15/2017     (H) 05/11/2017    CO2 22 05/11/2017         This note has been dictated using voice recognition software. Any grammatical or context distortions are unintentional and inherent to the software.  Daniele Grace MD  [facility]  [phone]

## 2021-06-12 NOTE — PROGRESS NOTES
In clinic device check.  Please see link for full device report.  Patient was informed of results and next follow up during today's visit.    Pocket is healing well and no signs of infection. Patient denies pain. Occasionally it feels sore, but that is pretty rare.     Will let Dr. Grace know.    Plan to see Dr. Grace in June 2018.    Devika Parker RN BSN  [facility] Device [facility]

## 2021-06-12 NOTE — PROGRESS NOTES
Dear Dr. Daniele Grace Md  [address] [place] [address]    Thank you for the opportunity to participate in the care of Mr. Jairon Gomez.    He is a 71 y.o. male who comes to the clinic with a chief complaint of witnessed apnea.  The patient has a heavy cardiac history including atrial fibrillation with RVR as well as sick sinus syndrome status post pacemaker in situ.  The patient has been told by his wife that he has significant pauses in his breathing during sleep followed by loud snoring which has been going on for more than 20 years.  He also has had episodes of falling asleep easily while watching TV.  His review of system is significant for episodes of syncope status post fall that has been addressed in the past.     Ideal Sleep-Wake Cycle(devoid of societal pressure):    Patient would try to initiate sleep at around 1 AM with a sleep latency of 5-10 minutes. The patient would have 1-2 nocturnal awakening. Final wake up time is around 9 AM.    Past Medical History  Past Medical History:   Diagnosis Date     Alcohol abuse, in remission      Back pain     5 spurs in back     Diabetes      Essential hypertension      Gastric ulcer 9/12/2015     GERD (gastroesophageal reflux disease)      Obesity      Osteoarthritis      Prostate cancer      Psoriasis      Skull fracture and subdural hematoma 2002    as a result of loss of consciousness and fall at the [place] airport; negative workup during one week hospital stay with separate neuro and EP workup in [place] after return        Past Surgical History  Past Surgical History:   Procedure Laterality Date     BACK SURGERY  2011    for herniated disc, L3-L4     ESOPHAGOGASTRODUODENOSCOPY N/A 9/9/2015    Procedure: ESOPHAGOGASTRODUODENOSCOPY;  Surgeon: Karson Bauman MD;  Location: [facility];  Service:      SD EDG US EXAM SURGICAL ALTER STOM DUODENUM/JEJUNUM N/A 11/16/2015    Procedure: ESOPHAGOGASTRODUODENOSCOPY/ENDOSCOPIC ULTRASOUND;  Surgeon:  Juan Diego Gonzalez MD;  Location: [facility];  Service: Gastroenterology     PROSTATECTOMY  1/26/2001    with bilateral lymph node dissection        Meds  Current Outpatient Prescriptions   Medication Sig Dispense Refill     acetaminophen (TYLENOL) 500 MG tablet Take 1 tablet (500 mg total) by mouth every 6 (six) hours as needed for pain or fever. 30 tablet 0     allopurinol (ZYLOPRIM) 300 MG tablet Take 300 mg by mouth daily.        ascorbic acid, vitamin C, (ASCORBIC ACID WITH CINDI HIPS) 500 MG tablet Take 500 mg by mouth daily.       aspirin 81 mg chewable tablet Chew 1 tablet (81 mg total) daily. 30 tablet 0     furosemide (LASIX) 20 MG tablet Take 20 mg by mouth daily.       lactulose 20 gram/30 mL Soln solution Take 10 g by mouth 3 (three) times a day as needed (titrate up to diarrhea).        lisinopril (PRINIVIL,ZESTRIL) 10 MG tablet Take 2 tablets (20 mg total) by mouth daily. 60 tablet 0     magnesium oxide (MAG-OX) 400 mg tablet Take 400 mg by mouth 2 (two) times a day.       multivitamin therapeutic (THERAGRAN) tablet Take 1 tablet by mouth daily.       omeprazole (PRILOSEC) 20 MG capsule Take 1 capsule (20 mg total) by mouth Daily before breakfast. (Patient taking differently: Take 20 mg by mouth Daily before breakfast. Patient takes as needed.) 30 capsule 0     ONETOUCH ULTRA TEST strips        rivaroxaban 20 mg Tab Take 1 tablet (20 mg total) by mouth daily with supper. 90 tablet 3     SALMON OIL/OMEGA-3 FATTY ACIDS (SALMON OIL-1000 ORAL) Take 1 capsule by mouth daily.        sotalol (BETAPACE) 80 MG tablet Take 1 tablet (80 mg total) by mouth every 12 (twelve) hours. 60 tablet 0     No current facility-administered medications for this visit.         Allergies  Review of patient's allergies indicates no known allergies.     Social History  Social History     Social History     Marital status:      Spouse name: Cecilia     Number of children: N/A     Years of education: N/A     Occupational  History     Not on file.     Social History Main Topics     Smoking status: Former Smoker     Smokeless tobacco: Not on file     Alcohol use Yes      Comment: Maybe once or twice a month     Drug use: No     Sexual activity: Not on file     Other Topics Concern     Not on file     Social History Narrative        Family History  Family History   Problem Relation Age of Onset     Hepatitis Sister      Hepatitis C     Diabetes Brother      Heart disease Brother      Heart disease Brother      Heart disease Brother      Cancer Sister      Diabetes Sister      Alzheimer's disease Mother      Stroke Father 76        Review of Systems:  Constitutional: Negative except as noted in HPI.   Eyes: Negative except as noted in HPI.   ENT: Negative except as noted in HPI.   Cardiovascular: Negative except as noted in HPI.   Respiratory: Negative except as noted in HPI.   Gastrointestinal: Negative except as noted in HPI.   Genitourinary: Negative except as noted in HPI.   Musculoskeletal: Negative except as noted in HPI.   Integumentary: Negative except as noted in HPI.   Neurological: Negative except as noted in HPI.   Psychiatric: Negative except as noted in HPI.   Endocrine: Negative except as noted in HPI.   Hematologic/Lymphatic: Negative except as noted in HPI.      STOP BANG 7/28/2017   Do you snore loudly (louder than talking or loud enough to be heard through closed doors)? 1   Do you often feel tired, fatigued, or sleepy during daytime? 0   Has anyone observed you stop breathing in your sleep? 1   Do you have or are you being treated for high blood pressure? 1   BMI more than 35 kg/m2 1   Age over 50 years old? 1   Neck circumference greater than 16 inches? 0   Gender male? 1   Total Score 6   Epworths Sleepiness Scale 7/28/2017   Sitting and reading 1   Watching TV 1   Sitting, inactive in a public place (e.g. a theatre or a meeting) 0   As a passenger in a car for an hour without a break 1   Lying down to rest in the  "afternoon when circumstances permit 2   Sitting and talking to someone 0   Sitting quietly after a lunch without alcohol 0   In a car, while stopped for a few minutes in traffic 0   Total score 5   Rooming 7/28/2017   Usual bedtime 1130   Sleep Latency 5-10 mn   Awakenings 1-2   Wake Up Time 8   Energy Drinks 0   Coffee 1-2   Cola 1   Difficulty falling asleep No   Difficulty staying asleep No   Excessive daytime tiredness No   Excessive daytime sleepiness No   Dozing off while driving No   Shift Worker No   Sleep Walking? No   Sleep Talking? Yes   Kicking or punching? Yes   Restless legs symptoms Yes       Physical Exam:  /58  Pulse 66  Ht 5' 10\" (1.778 m)  Wt (!) 286 lb 8 oz (130 kg)  SpO2 97%  BMI 41.11 kg/m2  BMI:Body mass index is 41.11 kg/(m^2).   GEN: NAD, morbidly obese.  The patient walks with a cane.  Head: Normocephalic.  EYES: PERRLA, EOMI  ENT: Oropharynx is clear, mallampatti class 4+ airway.   Nasal mucosa is moist without erythema  Neck : Thyroid is within normal limits. Neck circ 15.5 inches  CV: Regular rate and rhythm, S1 & S2 positive.  LUNGS: Bilateral breathsounds heard.   ABDOMEN: Positive bowel sounds in all quadrants, soft, no rebound or guarding  MUSCULOSKELETAL: Bilateral 2+ leg swelling  SKIN: warm, dry, no rashes  Neurological: Alert, oriented to time, place, and person.  Psych: normal mood, normal affect     Labs/Studies:     Lab Results   Component Value Date    WBC 7.4 07/08/2017    HGB 13.4 (L) 07/08/2017    HCT 39.6 (L) 07/08/2017     07/08/2017     (L) 07/08/2017         Chemistry        Component Value Date/Time     07/08/2017 1806    K 4.4 07/08/2017 1806     (H) 07/08/2017 1806    CO2 24 07/08/2017 1806    BUN 15 07/08/2017 1806    CREATININE 1.22 07/08/2017 1806     07/08/2017 1806        Component Value Date/Time    CALCIUM 9.0 07/08/2017 1806    ALKPHOS 127 (H) 07/08/2017 1806    AST 54 (H) 07/08/2017 1806    ALT 22 07/08/2017 1806 "    MARS 1.3 (H) 07/08/2017 1806            No results found for: FERRITIN  No results found for: TSH      Assessment and Plan:  In summary Jairon Gomez is a 71 y.o. year old male here for sleep disturbance.  1.  Suspected sleep apnea   . Jairon Gomez has high risk for obstructive sleep apnea based on the history of witnessed apnea, snoring and a crowded airway. I educated the patient on the underlying pathophysiology of obstructive sleep apnea. We reviewed the risks associated with sleep apnea, including increased cardiovascular risk and overall death. We talked about treatments briefly. I recommend getting an split-night nocturnal polysomnography. The patient should return to the clinic to discuss results and treatment option in a patient-centered approach.  2.  Hypersomnia  3.  Snoring  4.  Other sleep disturbance      Patient verbalized understanding of these issues, agrees with the plan and all questions were answered today. Patient was given an opportuntity to voice any other symptoms or concerns not listed above. Patient did not have any other symptoms or concerns.      Patient told to return in one week after the sleep study is interpreted. Patient instructed to stop at  to schedule appointment before leaving today.       Boo Bhatt DO  Board Certified in Internal Medicine and Sleep Medicine  [facility].    (Note created with Dragon voice recognition and unintended spelling errors and word substitutions may occur)

## 2021-06-12 NOTE — PROGRESS NOTES
Dear Dr. Darrell Almanza MD  [address] [place] [address],    Thank you for the opportunity to participate in the care of Jairon Gomez.     He is a 71 y.o.  male patient who comes to the sleep medicine clinic for review of his sleep study. The study was completed on 08/15/17 showed the the patient had upper airway resistance syndrome as well as periodic limb movements sleep.    Past Medical History:   Diagnosis Date     Alcohol abuse, in remission      Back pain     5 spurs in back     Diabetes      Essential hypertension      Gastric ulcer 9/12/2015     GERD (gastroesophageal reflux disease)      Obesity      Osteoarthritis      Prostate cancer      Psoriasis      Skull fracture and subdural hematoma 2002    as a result of loss of consciousness and fall [place]; negative workup during one week hospital stay with separate neuro and EP workup in [place] after return       Past Surgical History:   Procedure Laterality Date     BACK SURGERY  2011    for herniated disc, L3-L4     ESOPHAGOGASTRODUODENOSCOPY N/A 9/9/2015    Procedure: ESOPHAGOGASTRODUODENOSCOPY;  Surgeon: Karson Bauman MD;  Location: [facility];  Service:      WA EDG US EXAM SURGICAL ALTER STOM DUODENUM/JEJUNUM N/A 11/16/2015    Procedure: ESOPHAGOGASTRODUODENOSCOPY/ENDOSCOPIC ULTRASOUND;  Surgeon: Juan Diego Gonzalez MD;  Location: [facility];  Service: Gastroenterology     PROSTATECTOMY  1/26/2001    with bilateral lymph node dissection       Social History     Social History     Marital status:      Spouse name: Cecilia     Number of children: N/A     Years of education: N/A     Occupational History     Not on file.     Social History Main Topics     Smoking status: Former Smoker     Smokeless tobacco: Not on file     Alcohol use Yes      Comment: Maybe once or twice a month     Drug use: No     Sexual activity: Not on file     Other Topics Concern     Not on file     Social History Narrative  "      Current Outpatient Prescriptions   Medication Sig Dispense Refill     acetaminophen (TYLENOL) 500 MG tablet Take 1 tablet (500 mg total) by mouth every 6 (six) hours as needed for pain or fever. 30 tablet 0     allopurinol (ZYLOPRIM) 300 MG tablet Take 300 mg by mouth daily.        ascorbic acid, vitamin C, (ASCORBIC ACID WITH CINDI HIPS) 500 MG tablet Take 500 mg by mouth daily.       aspirin 81 mg chewable tablet Chew 1 tablet (81 mg total) daily. 30 tablet 0     furosemide (LASIX) 20 MG tablet Take 20 mg by mouth daily.       lactulose 20 gram/30 mL Soln solution Take 10 g by mouth 3 (three) times a day as needed (titrate up to diarrhea).        lisinopril (PRINIVIL,ZESTRIL) 10 MG tablet Take 2 tablets (20 mg total) by mouth daily. 60 tablet 0     magnesium oxide (MAG-OX) 400 mg tablet Take 400 mg by mouth 2 (two) times a day.       multivitamin therapeutic (THERAGRAN) tablet Take 1 tablet by mouth daily.       omeprazole (PRILOSEC) 20 MG capsule Take 1 capsule (20 mg total) by mouth Daily before breakfast. (Patient taking differently: Take 20 mg by mouth Daily before breakfast. Patient takes as needed.) 30 capsule 0     ONETOUCH ULTRA TEST strips        rivaroxaban 20 mg Tab Take 1 tablet (20 mg total) by mouth daily with supper. 90 tablet 3     SALMON OIL/OMEGA-3 FATTY ACIDS (SALMON OIL-1000 ORAL) Take 1 capsule by mouth daily.        sotalol (BETAPACE) 80 MG tablet Take 1 tablet (80 mg total) by mouth every 12 (twelve) hours. 60 tablet 0     No current facility-administered medications for this visit.        No Known Allergies    Physical Exam:  /58 (Patient Site: Right Arm, Patient Position: Sitting, Cuff Size: Adult Large)  Pulse 62  Ht 5' 10\" (1.778 m)  Wt (!) 292 lb 4.8 oz (132.6 kg)  SpO2 97%  BMI 41.94 kg/m2  BMI:Body mass index is 41.94 kg/(m^2).   GEN: NAD, obese  Psych: normal mood, normal affect     Labs/Studies:  - We reviewed the results of the overnight PSG as described on the HPI. "     Lab Results   Component Value Date    WBC 7.4 07/08/2017    HGB 13.4 (L) 07/08/2017    HCT 39.6 (L) 07/08/2017     07/08/2017     (L) 07/08/2017         Chemistry        Component Value Date/Time     07/08/2017 1806    K 4.4 07/08/2017 1806     (H) 07/08/2017 1806    CO2 24 07/08/2017 1806    BUN 15 07/08/2017 1806    CREATININE 1.22 07/08/2017 1806     07/08/2017 1806        Component Value Date/Time    CALCIUM 9.0 07/08/2017 1806    ALKPHOS 127 (H) 07/08/2017 1806    AST 54 (H) 07/08/2017 1806    ALT 22 07/08/2017 1806    BILITOT 1.3 (H) 07/08/2017 1806            No results found for: FERRITIN        Assessment and Plan:  In summary Jairon Gomez is a 71 y.o. year old male here for review of his sleep study.  1.  Upper airway resistance syndrome  I educated the patient and his wife on the underlying pathophysiology of this disease process.  We discussed the treatment options available including CPAP, oral appliance, and positional therapy.  Due to cost concerns they would like to pursue positional therapy at this point in time.  Recommend that they try positional therapy for 3 months and if his symptoms fail to improve, I may consider a repeat sleep study at that point in time.  2.  Hypersomnia  3.  Periodic limb movements sleep  Most likely secondary to upper airway resistance syndrome.  4.  Snoring     Patient verbalized understanding of these issues, agrees with the plan and all questions were answered today. Patient was given an opportuntity to voice any other symptoms or concerns not listed above. Patient did not have any other symptoms or concerns.      Follow-up as needed    Boo Bhatt DO  Board Certified in Internal Medicine and Sleep Medicine  [facility].    We spent a total of 15 minutes of face-to-face encounter and more than 50% of the encounter was used for counseling or coordination of care.    (Note created with Dragon voice  recognition and unintended spelling errors and word substitutions may occur)

## 2021-06-15 PROBLEM — R00.1 SINUS BRADYCARDIA: Status: ACTIVE | Noted: 2017-05-10
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
## 2021-06-15 PROBLEM — Z95.0 CARDIAC PACEMAKER IN SITU: Status: ACTIVE | Noted: 2017-05-22
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
## 2021-06-15 NOTE — ANESTHESIA CARE TRANSFER NOTE
Last vitals:   Vitals:    01/23/18 1027   BP: 147/68   Pulse: 78   Resp: 20   Temp: 36.3  C (97.4  F)   SpO2: 98%     Patient's level of consciousness is drowsy  Spontaneous respirations: yes  Maintains airway independently: yes  Dentition unchanged: yes  Oropharynx: oropharynx clear of all foreign objects    QCDR Measures:  ASA# 20 - Surgical Safety Checklist: WHO surgical safety checklist completed prior to induction  PQRS# 430 - Adult PONV Prevention: 4558F - Pt received => 2 anti-emetic agents (different classes) preop & intraop  ASA# 8 - Peds PONV Prevention: NA - Not pediatric patient, not GA or 2 or more risk factors NOT present  PQRS# 424 - Romi-op Temp Management: 4559F - At least one body temp DOCUMENTED => 35.5C or 95.9F within required timeframe  PQRS# 426 - PACU Transfer Protocol: - Transfer of care checklist used  ASA# 14 - Acute Post-op Pain: ASA14B - Patient did NOT experience pain >= 7 out of 10

## 2021-06-15 NOTE — ANESTHESIA POSTPROCEDURE EVALUATION
Patient: Jairon Gomez  LEFT MIDFOOT FUSION WITH ALLOGRAFT  Anesthesia type: general    Patient location: PACU  Last vitals:   Vitals:    01/23/18 1130   BP: 151/65   Pulse: 67   Resp: 17   Temp:    SpO2: 97%     Post vital signs: stable  Level of consciousness: awake and responds to simple questions  Post-anesthesia pain: pain controlled  Post-anesthesia nausea and vomiting: no  Pulmonary: unassisted  Cardiovascular: stable  Hydration: adequate  Anesthetic events: no    QCDR Measures:  ASA# 11 - Romi-op Cardiac Arrest: ASA11B - Patient did NOT experience unanticipated cardiac arrest  ASA# 12 - Romi-op Mortality Rate: ASA12B - Patient did NOT die  ASA# 13 - PACU Re-Intubation Rate: ASA13B - Patient did NOT require a new airway mgmt  ASA# 10 - Composite Anes Safety: ASA10A - No serious adverse event    Additional Notes:

## 2021-06-15 NOTE — ANESTHESIA PROCEDURE NOTES
Peripheral Block    Patient location during procedure: pre-op  Start time: 1/23/2018 7:03 AM  End time: 1/23/2018 7:07 AM  post-op analgesia per surgeon order as noted in medical record  Staffing:  Performing  Anesthesiologist: BISMARK BUSH  Preanesthetic Checklist  Completed: patient identified, site marked, risks, benefits, and alternatives discussed, timeout performed, consent obtained, airway assessed, oxygen available, suction available, emergency drugs available and hand hygiene performed  Peripheral Block  Block type: sciatic, popliteal  Prep: ChloraPrep  Patient position: supine  Patient monitoring: blood pressure, heart rate, continuous pulse oximetry and cardiac monitor  Laterality: left  Injection technique: ultrasound guided    Ultrasound used to visualize needle placement in proximity to nerve being blocked: yes   Ultrasound image captured: U/S unable to print.  Sterile gel and probe cover used for ultrasound.    Needle  Needle type: Stimuplex   Needle gauge: 22 G  Needle length: 4 in  no peripheral nerve catheter placed  Assessment  Injection assessment: no difficulty with injection

## 2021-06-15 NOTE — ANESTHESIA PREPROCEDURE EVALUATION
Anesthesia Evaluation      Patient summary reviewed   No history of anesthetic complications     Airway   Mallampati: II  Neck ROM: full   Pulmonary - normal exam   (+) sleep apnea on no CPAP, ,                          Cardiovascular - normal exam  (+) hypertension, ,     ECG reviewed        Neuro/Psych - negative ROS     Endo/Other    (+) diabetes mellitus, obesity,      GI/Hepatic/Renal    (+) GERD,             Dental    (+) upper dentures and lower dentures                       Anesthesia Plan  Planned anesthetic: general LMA    ASA 3   Induction: intravenous   Anesthetic plan and risks discussed with: patient    Post-op plan: routine recovery

## 2021-06-16 PROBLEM — S42.301A FRACTURE OF RIGHT HUMERUS: Status: ACTIVE | Noted: 2018-01-01

## 2021-06-16 PROBLEM — S42.309A FRACTURE, HUMERUS CLOSED: Status: ACTIVE | Noted: 2018-01-01

## 2021-06-16 PROBLEM — S42.209A PROXIMAL HUMERUS FRACTURE: Status: ACTIVE | Noted: 2018-01-01

## 2021-06-16 PROBLEM — S42.201A FRACTURE OF PROXIMAL END OF RIGHT HUMERUS: Status: ACTIVE | Noted: 2018-01-01

## 2021-06-16 PROBLEM — Z98.890 STATUS POST SURGERY: Status: ACTIVE | Noted: 2018-01-01

## 2021-06-16 PROBLEM — R94.31 EKG ABNORMALITIES: Status: ACTIVE | Noted: 2018-01-01

## 2021-06-16 PROBLEM — R33.8 ACUTE RETENTION OF URINE: Status: ACTIVE | Noted: 2018-01-01

## 2021-06-19 NOTE — PROGRESS NOTES
[facility] Note    Assessment:   Recurrent abrupt syncope  Tachybradycardia syndrome        tachycardia manifested as atrial fibrillation with elevated ventricular response         bradycardias manifest as sick sinus syndrome with episodes of sinus arrest  Dual-chamber [employer] pacemaker implantation May 15, 2017   Preserved left ventricular function  Chronic hypertension  Obesity  Sleep disorder likely sleep apnea   Left foot injury with extensive orthopedic surgical repair January 2013    ECG shows normal sinus rhythm with QT equals 434, QTC equals 454 tracing normal      Plan:  ECG today looks good  The blood work today will include a basic metabolic profile to assess kidney function potassium  Because of frequent episodes atrial fibrillation, increase sotalol from 80 mg twice daily, now take 120 mg twice daily  Continue to have pacemaker check through transtelephonic monitoring every 3 months  Return in 1 year for comprehensive cardiac arrhythmia device assessment  We did discuss management of atrial fibrillation and how it is important to control heart rate, prevent blood clots with anticoagulation (Xarelto)   and control for symptoms  Try to lose some weight          Subjective:    I had the opportunity to see.Jairon Gomez , who is a 72 y.o. male with a known history of paroxysmal atrial fibrillation, sinus node dysfunction  He has been quite troubled with left foot injury.  He injured it around the the end of December.  He subsequently saw the orthopedic doctor and underwent extensive operation the left foot he has had considerable recovery and continues have some tenderness but uses no brace at this time  No heart pains no angina  Never feels palpitations has no awareness of atrial fibrillation even though has considerable amount of atrial fibrillation  Chronic pedal edema  Shortness of breath on exertion  He takes Xarelto 20 mg daily-seems to do well on this medicine with no bleeding  disorders  Also takes sotalol 80 mg twice daily  Creatinine 1.12 on January 12, 2018  Had a sleep study but only scored 4-he would have had Medicare coverage if he scored 5.  He did not want to pay the personal expense for a CPAP machine and so never got an appliance/system  Does not snore much  Advised to sleep on his side which he usually does      Problem List:  Patient Active Problem List   Diagnosis     Type 2 diabetes mellitus with hyperglycemia, without long-term current use of insulin (H)     Hypomagnesemia     Gout     Obese     Alcoholic cirrhosis of liver without ascites (H)     Fall at home     Sinus bradycardia     Essential hypertension     Atrial fibrillation with RVR (H)     Syncope and collapse     Sinus pause     SSS (sick sinus syndrome) (H)     Cardiac pacemaker in situ     Medical History:  Past Medical History:   Diagnosis Date     Alcohol abuse, in remission      Ascites      Back pain     5 spurs in back     Diabetes (H)      Essential hypertension      Foot fracture, left      Gastric ulcer 9/12/2015     GERD (gastroesophageal reflux disease)      Gout      HLD (hyperlipidemia)      Infectious viral hepatitis     ALCOHOLIC CIRRHOSIS     Obesity      Osteoarthritis      Pacemaker      PAF (paroxysmal atrial fibrillation) (H)      Prostate cancer (H)      Psoriasis      Skull fracture and subdural hematoma 2002    as a result of loss of [place] and [place]; negative workup during one week hospital stay with separate neuro and EP workup in [place] after return     Sleep apnea      SSS (sick sinus syndrome) (H)      Surgical History:  Past Surgical History:   Procedure Laterality Date     BACK SURGERY  2011    for herniated disc, L3-L4     CARDIAC PACEMAKER PLACEMENT       ESOPHAGOGASTRODUODENOSCOPY N/A 9/9/2015    Procedure: ESOPHAGOGASTRODUODENOSCOPY;  Surgeon: Karson Bauman MD;  Location: [facility];  Service:      FOOT ARTHRODESIS Left 1/23/2018     Procedure: LEFT MIDFOOT FUSION WITH ALLOGRAFT;  Surgeon: Carey Ballesteros MD;  Location: [facility] OR;  Service:      DE EDG US EXAM SURGICAL ALTER STOM DUODENUM/JEJUNUM N/A 11/16/2015    Procedure: ESOPHAGOGASTRODUODENOSCOPY/ENDOSCOPIC ULTRASOUND;  Surgeon: Juan Diego Gonzalez MD;  Location: [facility];  Service: Gastroenterology     PROSTATECTOMY  1/26/2001    with bilateral lymph node dissection     TONSILLECTOMY       Social History:  Social History     Social History     Marital status:      Spouse name: Cecilia     Number of children: N/A     Years of education: N/A     Occupational History     Not on file.     Social History Main Topics     Smoking status: Former Smoker     Smokeless tobacco: Never Used     Alcohol use No     Drug use: No     Sexual activity: Not on file     Other Topics Concern     Not on file     Social History Narrative       Review of Systems:      General: WNL  Eyes: WNL  Ears/Nose/Throat: Nosebleeds  Lungs: WNL  Heart: Shortness of Breath with activity  Stomach: WNL  Bladder: WNL  Muscle/Joints: WNL  Skin: WNL  Nervous System: WNL  Mental Health: WNL     Blood: WNL        Family History:  Family History   Problem Relation Age of Onset     Hepatitis Sister      Hepatitis C     Diabetes Brother      Heart disease Brother      Heart disease Brother      Heart disease Brother      Cancer Sister      Diabetes Sister      Alzheimer's disease Mother      Stroke Father 76         Allergies:  Allergies   Allergen Reactions     Lipitor [Atorvastatin]      Pt. unsure why allergic/intolerant.     Nsaids (Non-Steroidal Anti-Inflammatory Drug) Other (See Comments)     ULCER HX       Medications:  Current Outpatient Prescriptions   Medication Sig Dispense Refill     acetaminophen (TYLENOL) 500 MG tablet Take 1 tablet (500 mg total) by mouth every 6 (six) hours as needed for pain or fever. 30 tablet 0     allopurinol (ZYLOPRIM) 300 MG tablet Take 300 mg by mouth daily.        ascorbic  acid, vitamin C, (ASCORBIC ACID WITH CINDI HIPS) 500 MG tablet Take 500 mg by mouth daily.       aspirin 81 mg chewable tablet Chew 1 tablet (81 mg total) daily. 30 tablet 0     furosemide (LASIX) 20 MG tablet Take 20 mg by mouth daily.       L.ACID/L.CASEI/B.BIF/B.JERRY/FOS (PROBIOTIC BLEND ORAL) Take 1 capsule by mouth daily.       lactulose 20 gram/30 mL Soln solution Take 10 g by mouth 3 (three) times a day as needed (titrate up to diarrhea).        lisinopril (PRINIVIL,ZESTRIL) 10 MG tablet Take 2 tablets (20 mg total) by mouth daily. 60 tablet 0     magnesium oxide (MAG-OX) 400 mg tablet Take 500 mg by mouth 2 (two) times a day.        multivitamin therapeutic (THERAGRAN) tablet Take 1 tablet by mouth daily.       omeprazole (PRILOSEC) 20 MG capsule Take 1 capsule (20 mg total) by mouth Daily before breakfast. (Patient taking differently: Take 20 mg by mouth Daily before breakfast. Patient takes as needed.) 30 capsule 0     ONETOUCH ULTRA TEST strips        SALMON OIL/OMEGA-3 FATTY ACIDS (SALMON OIL-1000 ORAL) Take 1 capsule by mouth daily.        sotalol (BETAPACE) 80 MG tablet Take 1 tablet (80 mg total) by mouth every 12 (twelve) hours. 60 tablet 0     XARELTO 20 mg Tab 1 tablet daily with supper 30 tablet 11     oxyCODONE (ROXICODONE) 5 MG immediate release tablet PO Take 1 to 2 tablets every 4 to 6 hours as needed for pain. 80 tablet 0     No current facility-administered medications for this visit.          Surgical site  Pacemaker is well-healed to left subclavicular site no signs of erythema or infection      Device interrogation  Intrinsic rhythm is sinus rhythm with intact AV conduction  7% atrial pacing almost no ventricular pacing  Multiple episodes atrial fibrillation with A. fib burden equals 5% with episodes last up to 44 hours.  Atrial fibrillation ventricular rate is generally controlled although 20% of the time it is above 120 bpm  No ventricular tachycardia  Lead function satisfactory  Battery  "good for another 14 years    Objective:   Vital signs:  /64 (Patient Site: Right Arm, Patient Position: Sitting, Cuff Size: Adult Large)  Pulse 68  Resp 16  Ht 5' 10\" (1.778 m)  Wt (!) 304 lb (137.9 kg)  BMI 43.62 kg/m2  Patient  quite overweight-304 pounds  Walks  tenderly, favoring left ankle    Physical Exam:      GENERAL APPEARANCE: Alert, cooperative and in no acute distress.  HEENT: No scleral icterus. No Xanthelasma. Oral mucous membranes pink and moist.  NECK: JVP difficult to assess   . No Hepatojugular reflux. Thyroid not  Palpable  CHEST: clear to auscultation and percussion  CARDIOVASCULAR: S1, S2 without murmur    Brachial, radial  pulses are intact and symmetric.   No carotid bruits noted.  ABDOMEN: Non tender. BS+. No bruits.  EXTREMITIES: Ankles are quite large no clear-cut pitting edema     Lab Results:  LIPIDS:  Lab Results   Component Value Date    CHOL 157 05/19/2017    CHOL 148 05/11/2016    CHOL 130 06/15/2015     Lab Results   Component Value Date    HDL 32 (L) 05/19/2017    HDL 43 05/11/2016    HDL 34 (L) 06/15/2015     Lab Results   Component Value Date    LDLCALC 106 05/19/2017    LDLCALC 88 05/11/2016    LDLCALC 77 06/15/2015     Lab Results   Component Value Date    TRIG 94 05/19/2017    TRIG 84 05/11/2016    TRIG 96 06/15/2015     No components found for: CHOLHDL    BMP:  Lab Results   Component Value Date    CREATININE 1.12 01/12/2018    BUN 17 01/12/2018     01/12/2018    K 4.6 01/12/2018     01/12/2018    CO2 24 01/12/2018         This note has been dictated using voice recognition software. Any grammatical or context distortions are unintentional and inherent to the software.  Daniele Grace MD  [facility] HEART [facility]  [phone]            "

## 2021-06-20 NOTE — PROGRESS NOTES
Code Status:  FULL CODE  Visit Type: Problem Visit     Facility:  [facility] [[id number]]         Facility Type: SNF (Skilled Nursing Facility, TCU)    History of Present Illness: Jairon Gomez is a 72 y.o. male with hx of   stable cirrhosis, DM2, HTN, morbid obesity, chronic CHF, atrial fibrillation (chronic) w recent pacer, recently hospitalized on 8/20/18 secondary to fall sustaining a right comminuted Humeral fracturewith radial nerve palsy.  Ortho-Est consulted and nonsurgical management was initiated including humeral fracture brace and wrist support.  He also continues in a shoulder immobilizer.  There was initial concern for compartment syndrome given anticoagulation.  His hemoglobin dropped 2 g.  His Xarelto was stopped will be assessed at his next Orth of follow-up on 8/28.  At that time they will also attempt manual alignment.  If unsuccessful patient may have to undergo ORIF.  Initially patient had hypotension in lieu of chronic hypertension secondary to hypovolemia and acute blood loss anemia.  He did receive 1 unit of packed red blood cells.  His sotalol was held initially however resumed at discharge.  He did undergo urinalysis and renal ultrasound secondary to his AK I.  Lasix and lisinopril were initially held however resumed upon discharge.  He did initially have leukocytosis with a lactic acid of 3.5.  He was initially treated with Zosyn for 48 hours however this was stopped due to negative cultures.  He also had a negative chest x-ray.  Chronic diastolic congestive heart failure.  Continues with chronic lymphedema.  Last echo reviewed.  Normal ejection fraction of 65% with grade 2 diastolic dysfunction.  Atrial fib with tachybradycardia syndrome.  Does have pacemaker.  Is rate controlled with sotalol.  He is normally on chronic anticoagulation with Xarelto.  This is currently being held.  Hepatic encephalopathy acute on chronic.  He does continue on lactulose.  He did undergo  head CT which was negative.  Urinary retention.  He was started on Flomax.  Renal ultrasound negative for hydronephrosis.  Diabetes mellitus type 2.  Diet controlled.  Hemoglobin A1c 5.2.      Today patient sitting up in bedside chair.  Patient is receiving lymphedema therapy.  Continues with lymphedema therapy wraps on.  His legs are somewhat decreased from the swelling.  He does continue on large dose Lasix.  History of congestive heart failure.  Chronic shortness of breath with exertion.  No chest pain.  Atrial field on chronic anticoagulation.  Humeral fracture.  Continues in splinting including swelling and redness.  Edema slightly improved to his right hand.  Edema sleeve has been ordered.  We are attempting to elevate on pillows.  Pain well-controlled with oxycodone and Tylenol.       Active Ambulatory Problems     Diagnosis Date Noted     Type 2 diabetes mellitus without complication, without long-term current use of insulin (H)      Hypomagnesemia 09/12/2015     Gout 03/01/2016     Obese 07/17/2016     Alcoholic cirrhosis of liver without ascites (H)      Fall at home 05/10/2017     Sinus bradycardia 05/10/2017     Essential hypertension      Atrial fibrillation with RVR (H)      Syncope and collapse      Sinus pause      SSS (sick sinus syndrome) (H)      Cardiac pacemaker in situ 05/22/2017     Fracture, humerus closed 08/20/2018     Chronic diastolic CHF (congestive heart failure) (H)      Exertional dyspnea      Tachy-joe syndrome (H)      Paroxysmal atrial fibrillation (H)      Preoperative evaluation of a medical condition to rule out surgical contraindications (TAR required)      Anemia due to blood loss, acute      Acute retention of urine 08/22/2018     Resolved Ambulatory Problems     Diagnosis Date Noted     Hepatic encephalopathy (H) 09/09/2015     Hematemesis 09/09/2015     KHOI (acute kidney injury) (H) 09/09/2015     Gastric ulcer 09/12/2015     Gastric mass 09/12/2015     Knee effusion,  right 11/18/2015     Cellulitis of left lower extremity 07/17/2016     Cellulitis of leg 07/17/2016     Peripheral edema 07/17/2016     Cellulitis 07/18/2016     Benign essential HTN 07/18/2016     Elevated LFTs      Past Medical History:   Diagnosis Date     Alcohol abuse, in remission      Anemia due to blood loss, acute      Ascites      Back pain      Chronic diastolic CHF (congestive heart failure) (H)      Diabetes (H)      Essential hypertension      Exertional dyspnea      Foot fracture, left      Gastric ulcer 9/12/2015     GERD (gastroesophageal reflux disease)      Gout      HLD (hyperlipidemia)      Infectious viral hepatitis      Obesity      Osteoarthritis      Pacemaker      PAF (paroxysmal atrial fibrillation) (H)      Prostate cancer (H)      Psoriasis      Skull fracture and subdural hematoma 2002     Sleep apnea      SSS (sick sinus syndrome) (H)        Current Outpatient Prescriptions   Medication Sig     acetaminophen (TYLENOL) 500 MG tablet Take 1 tablet (500 mg total) by mouth every 6 (six) hours as needed for pain or fever.     allopurinol (ZYLOPRIM) 300 MG tablet Take 300 mg by mouth daily.      ascorbic acid, vitamin C, (VITAMIN C) 500 MG tablet Take 500 mg by mouth daily.     aspirin 81 mg chewable tablet Chew 1 tablet (81 mg total) daily.     furosemide (LASIX) 20 MG tablet Take 20 mg by mouth daily.     Lactobacillus rhamnosus GG (CULTURELLE) 10-15 Billion cell capsule Take 1 capsule by mouth daily.     Lactobacillus rhamnosus GG (CULTURELLE) 15 billion cell CpSP Take 1 capsule by mouth 3 (three) times a day with meals.     lactulose (ENULOSE) 20 gram/30 mL Soln solution Take 5 mL by mouth daily. Taking 1 teaspoonful daily     lisinopril (PRINIVIL,ZESTRIL) 10 MG tablet Take 2 tablets (20 mg total) by mouth daily.     magnesium oxide (MAG-OX) 400 mg tablet Take 500 mg by mouth 2 (two) times a day.      multivitamin therapeutic (THERAGRAN) tablet Take 1 tablet by mouth daily.     oxyCODONE  (ROXICODONE) 5 MG immediate release tablet Take 1-2 tablets (5-10 mg total) by mouth every 4 (four) hours as needed.     SALMON OIL/OMEGA-3 FATTY ACIDS (SALMON OIL-1000 ORAL) Take 1 capsule by mouth daily.      sotalol (BETAPACE) 120 MG tablet Take 1 tablet (120 mg total) by mouth 2 (two) times a day.     tamsulosin (FLOMAX) 0.4 mg cap Take 1 capsule (0.4 mg total) by mouth daily after supper.       Allergies   Allergen Reactions     Lipitor [Atorvastatin] Other (See Comments)     Had GI ulcer and could have been from Lipitor vs NSAIDs     Nsaids (Non-Steroidal Anti-Inflammatory Drug) Other (See Comments)     ULCER HX         Review of Systems   No fevers or chills. No headache, lightheadedness or dizziness. Chronic SOB with activity,  no chest pains or palpitations. Appetite is good. No nausea, vomiting, constipation or diarrhea. No dysuria, frequency, burning or pain with urination.  Pain well-controlled oxycodone.  Chronic lymphedema.  Edema to upper extremity.  Otherwise review of systems are negative.         Physical Exam   PHYSICAL EXAMINATION:  Vital signs: Reviewed in the record.  General: Awake, Alert, oriented x3, appropriately, follows simple commands, conversant  HEENT:PERRLA, Pink conjunctiva, anicteric sclerae, moist oral mucosa  NECK: Supple, without masses  CVS:  S1  S2, without murmur or gallop.   LUNG: Clear to auscultation, No wheezes, rales or rhonci.  No shortness of breath at rest.  BACK: No kyphosis of the thoracic spine  ABDOMEN: Soft, obese, nontender to palpation, with positive bowel sounds  EXTREMITIES:  Moves both upper and lower extremities with limitation on the right upper extremity, right wrist splint to right upper extremity as well as swelling.  He does have moderate edema 2-3+ in the right hand.  Splint in place to wrist secondary to radial nerve palsy on the RUE.  Chronic lymphedema with moderate lymphedema to bilateral lower extremities.  He does have lymphedema wraps in place.  RLE  edema slightly improved.  SKIN: Warm and dry.  Blisters intact to right upper extremity.  NEUROLOGIC: Intact, pulses faint secondary to edema.  PSYCHIATRIC: Cognition intact            Labs:    Recent Results (from the past 240 hour(s))   ECG 12 lead with MUSE   Result Value Ref Range    SYSTOLIC BLOOD PRESSURE  mmHg    DIASTOLIC BLOOD PRESSURE  mmHg    VENTRICULAR RATE 60 BPM    ATRIAL RATE 60 BPM    P-R INTERVAL 200 ms    QRS DURATION 90 ms    Q-T INTERVAL 456 ms    QTC CALCULATION (BEZET) 456 ms    P Axis 74 degrees    R AXIS 20 degrees    T AXIS 7 degrees    MUSE DIAGNOSIS       Sinus rhythm  Normal ECG  When compared with ECG of 18-JUL-2018 09:29,  No significant change was found  Confirmed by USMAN NESBITT MD LOC: ([id number]) on 8/25/2018 9:07:20 AM     Type and Screen   Result Value Ref Range    ABORh O POS     Antibody Screen Negative Negative   POCT Glucose   Result Value Ref Range    Glucose,  mg/dL   POCT Glucose   Result Value Ref Range    Glucose,  mg/dL   Hemoglobin   Result Value Ref Range    Hemoglobin 9.1 (L) 14.0 - 18.0 g/dL   Renal Function Profile   Result Value Ref Range    Albumin 2.4 (L) 3.5 - 5.0 g/dL    Calcium 8.2 (L) 8.5 - 10.5 mg/dL    Phosphorus 3.1 2.5 - 4.5 mg/dL    Glucose 117 70 - 125 mg/dL    BUN 38 (H) 8 - 28 mg/dL    Creatinine 1.23 0.70 - 1.30 mg/dL    Sodium 133 (L) 136 - 145 mmol/L    Potassium 4.5 3.5 - 5.0 mmol/L    Chloride 102 98 - 107 mmol/L    CO2 24 22 - 31 mmol/L    Anion Gap, Calculation 7 5 - 18 mmol/L    GFR MDRD Af Amer >60 >60 mL/min/1.73m2    GFR MDRD Non Af Amer 58 (L) >60 mL/min/1.73m2   Magnesium   Result Value Ref Range    Magnesium 1.9 1.8 - 2.6 mg/dL   Hemoglobin   Result Value Ref Range    Hemoglobin 9.3 (L) 14.0 - 18.0 g/dL   POCT Glucose   Result Value Ref Range    Glucose,  mg/dL   POCT Glucose   Result Value Ref Range    Glucose,  mg/dL   POCT Glucose   Result Value Ref Range    Glucose,  mg/dL   POCT Glucose    Result Value Ref Range    Glucose,  mg/dL   Crossmatch   Result Value Ref Range    Crossmatch Compatible     Blood Expiration Date [id number]     Unit Type O Pos     Unit Number [id number]     Status Transfused     Component Red Blood Cells     PRODUCT CODE I9873X30     Issue Date and Time [id number]     Blood Type 5100     CODING SYSTEM [id number]    Renal Function Profile   Result Value Ref Range    Albumin 2.5 (L) 3.5 - 5.0 g/dL    Calcium 8.6 8.5 - 10.5 mg/dL    Phosphorus 2.9 2.5 - 4.5 mg/dL    Glucose 114 70 - 125 mg/dL    BUN 26 8 - 28 mg/dL    Creatinine 0.82 0.70 - 1.30 mg/dL    Sodium 135 (L) 136 - 145 mmol/L    Potassium 4.7 3.5 - 5.0 mmol/L    Chloride 102 98 - 107 mmol/L    CO2 25 22 - 31 mmol/L    Anion Gap, Calculation 8 5 - 18 mmol/L    GFR MDRD Af Amer >60 >60 mL/min/1.73m2    GFR MDRD Non Af Amer >60 >60 mL/min/1.73m2   HM2(CBC w/o Differential)   Result Value Ref Range    WBC 9.9 4.0 - 11.0 thou/uL    RBC 2.80 (L) 4.40 - 6.20 mill/uL    Hemoglobin 9.6 (L) 14.0 - 18.0 g/dL    Hematocrit 28.7 (L) 40.0 - 54.0 %     (H) 80 - 100 fL    MCH 34.3 (H) 27.0 - 34.0 pg    MCHC 33.4 32.0 - 36.0 g/dL    RDW 15.2 (H) 11.0 - 14.5 %    Platelets 120 (L) 140 - 440 thou/uL    MPV 10.0 8.5 - 12.5 fL   POCT Glucose   Result Value Ref Range    Glucose,  mg/dL   ECG 12 lead with MUSE   Result Value Ref Range    SYSTOLIC BLOOD PRESSURE  mmHg    DIASTOLIC BLOOD PRESSURE  mmHg    VENTRICULAR RATE 67 BPM    ATRIAL RATE 67 BPM    P-R INTERVAL 184 ms    QRS DURATION 92 ms    Q-T INTERVAL 422 ms    QTC CALCULATION (BEZET) 445 ms    P Axis 42 degrees    R AXIS 27 degrees    T AXIS 21 degrees    MUSE DIAGNOSIS       Normal sinus rhythm  Normal ECG  When compared with ECG of 24-AUG-2018 16:20,  No significant change was found  Confirmed by YOMI DUGGAN MD LOC:SJ ([id number]) on 8/26/2018 12:31:08 PM     POCT Glucose   Result Value Ref Range    Glucose,  mg/dL   Basic Metabolic Panel   Result  Value Ref Range    Sodium 135 (L) 136 - 145 mmol/L    Potassium 4.5 3.5 - 5.0 mmol/L    Chloride 99 98 - 107 mmol/L    CO2 31 22 - 31 mmol/L    Anion Gap, Calculation 5 5 - 18 mmol/L    Glucose 84 70 - 125 mg/dL    Calcium 9.1 8.5 - 10.5 mg/dL    BUN 19 8 - 28 mg/dL    Creatinine 0.89 0.70 - 1.30 mg/dL    GFR MDRD Af Amer >60 >60 mL/min/1.73m2    GFR MDRD Non Af Amer >60 >60 mL/min/1.73m2   Hemoglobin   Result Value Ref Range    Hemoglobin 10.7 (L) 14.0 - 18.0 g/dL   Basic Metabolic Panel   Result Value Ref Range    Sodium 137 136 - 145 mmol/L    Potassium 4.4 3.5 - 5.0 mmol/L    Chloride 101 98 - 107 mmol/L    CO2 30 22 - 31 mmol/L    Anion Gap, Calculation 6 5 - 18 mmol/L    Glucose 82 70 - 125 mg/dL    Calcium 9.4 8.5 - 10.5 mg/dL    BUN 17 8 - 28 mg/dL    Creatinine 0.81 0.70 - 1.30 mg/dL    GFR MDRD Af Amer >60 >60 mL/min/1.73m2    GFR MDRD Non Af Amer >60 >60 mL/min/1.73m2   HM2(CBC w/o Differential)   Result Value Ref Range    WBC 8.8 4.0 - 11.0 thou/uL    RBC 3.09 (L) 4.40 - 6.20 mill/uL    Hemoglobin 10.9 (L) 14.0 - 18.0 g/dL    Hematocrit 32.6 (L) 40.0 - 54.0 %     (H) 80 - 100 fL    MCH 35.3 (H) 27.0 - 34.0 pg    MCHC 33.4 32.0 - 36.0 g/dL    RDW 15.3 (H) 11.0 - 14.5 %    Platelets 127 (L) 140 - 440 thou/uL    MPV 10.2 8.5 - 12.5 fL         Assessment/Plan:  1. Humeral fracture     2. Lymphedema     3. CHF (congestive heart failure) (H)     4. Atrial fibrillation (H)     5. Acute kidney injury (H)     6. Blood loss anemia     7. Diabetes mellitus (H)       Patient with recent fall sustaining right humeral head fracture with radial nerve palsy.  Continues in a sling as well as risks splint.  He does have chronic lymphedema.  Moderate edema in both his legs and right hand.  We are attempting to elevate with a pillow in his hands.  An edema sleeve was ordered.  Occupational Therapy is providing lymphedema therapy.  He continues on Lasix.  Continue to monitor daily weights.  Chronic shortness of breath  with activity.  No chest pain.  Did have acute kidney injury during hospitalization.  Recent BMP within normal limits.  Acute blood loss anemia.  Hemoglobin 10.7.  Diabetes.  Diet controlled.  Will monitor blood sugars.  Atrial fib.  Rate controlled.  Pain well-controlled oxycodone.      Electronically signed by: Nancy Ley, CNP

## 2021-06-20 NOTE — PROGRESS NOTES
[facility]      Facility:    [facility] [[id number]]  Code Status: UNKNOWN      Chief Complaint/Reason for Visit:  Chief Complaint   Patient presents with     H & P     Right shoulder fracture with ORIF, pain management, history of alcohol abuse in remission, acute kidney injury, history of acute blood loss anemia, history of ascites, diabetes, essential hypertension,.  Abnormal ST segment changes in the hospital.       HPI:   Jairon is a 72 y.o. male who is residing here at the [facility] undergoing physical and occupational therapy and conservative management for previous right shoulder fracture.  He did follow up with orthopedic surgery and it was noted that he did need to come in for surgery.  He was cleared for surgery with a preop physical here I believe by the nurse practitioner was sent in and underwent the procedure without any perioperative complications.  Postprocedure was uneventful however he did have some drop in hemoglobin his Xarelto was held.  He was discharged on aspirin 325 mg 2 times a day for now and likely will continue the Xarelto.  Hemoglobin yesterday was 7.7.  He does have atrial fibrillation and is anticoagulated for that purpose.  All around today he says his pain is under adequate control and was treated appropriately and transferred here to the TCU in stable condition.  He is in right arm brace at this time.    Patient claims all around his in good control with his pain.  His move his bowels okay and denies any other issues at this time.  Incision is healing well and staples are still intact without any signs of dehiscence or infection.        Past Medical History:  Past Medical History:   Diagnosis Date     KHOI (acute kidney injury) (H)      Alcohol abuse, in remission      Anemia due to blood loss, acute      Ascites      Back pain     5 spurs in back     Chronic diastolic CHF (congestive heart failure) (H)      Diabetes  (H)      Essential hypertension      Exertional dyspnea      Gastric ulcer 9/12/2015     GERD (gastroesophageal reflux disease)      Gout      HLD (hyperlipidemia)      Infectious viral hepatitis     ALCOHOLIC CIRRHOSIS     Lymphedema      Obesity      Osteoarthritis      Pacemaker      PAF (paroxysmal atrial fibrillation) (H)      Prostate cancer (H)      Psoriasis      Right humeral fracture      Skull fracture and subdural hematoma 2002    as a result of loss of consciousness and [place]; negative workup during one week hospital stay with separate neuro and EP workup in [place] after return     Sleep apnea     no cpap recommended     SSS (sick sinus syndrome) (H)            Surgical History:  Past Surgical History:   Procedure Laterality Date     BACK SURGERY  2011    for herniated disc, L3-L4     CARDIAC PACEMAKER PLACEMENT       CENTRAL VENOUS CATHETER INSERTION  08/24/2018    Non-tunneled - at [facility]     ESOPHAGOGASTRODUODENOSCOPY N/A 9/9/2015    Procedure: ESOPHAGOGASTRODUODENOSCOPY;  Surgeon: Karson Bauman MD;  Location: [facility] GI;  Service:      FOOT ARTHRODESIS Left 1/23/2018    Procedure: LEFT MIDFOOT FUSION WITH ALLOGRAFT;  Surgeon: Carey Ballesteros MD;  Location: [facility] Main OR;  Service:      MT EDG US EXAM SURGICAL ALTER STOM DUODENUM/JEJUNUM N/A 11/16/2015    Procedure: ESOPHAGOGASTRODUODENOSCOPY/ENDOSCOPIC ULTRASOUND;  Surgeon: Juan Diego Gonzalez MD;  Location: [facility] GI;  Service: Gastroenterology     PROSTATECTOMY  1/26/2001    with bilateral lymph node dissection     TONSILLECTOMY         Family History:   Family History   Problem Relation Age of Onset     Hepatitis Sister      Hepatitis C     Diabetes Brother      Heart disease Brother      Heart disease Brother      Heart disease Brother      Cancer Sister      Diabetes Sister      Alzheimer's disease Mother      Stroke Father 76       Social History:    Social History     Social History     Marital  status:      Spouse name: Cecilia     Number of children: N/A     Years of education: N/A     Social History Main Topics     Smoking status: Former Smoker     Smokeless tobacco: Never Used     Alcohol use No      Comment: Hx alcohol abuse- sober x 3 yrs     Drug use: No     Sexual activity: Not Asked     Other Topics Concern     None     Social History Narrative          Review of Systems   Constitutional:        Patient claims his pain is under adequate control denies any fevers chills nausea vomiting diarrhea change in vision hearing taste or smell weakness one side the other chest pain or shortness of breath.  The remainder review of systems is negative.       Vitals:    09/18/18 0807   BP: 103/58   Pulse: 66   Resp: 18   Temp: 97.1  F (36.2  C)   SpO2: 94%       Physical Exam   Constitutional: No distress.   HENT:   Head: Normocephalic and atraumatic.   Mouth/Throat: No oropharyngeal exudate.   Eyes: Conjunctivae are normal. Right eye exhibits no discharge. Left eye exhibits no discharge.   Neck: Neck supple.   Cardiovascular: Normal rate and regular rhythm.    Patient currently is in sinus rhythm by auscultation for 40 seconds.   Pulmonary/Chest: Effort normal and breath sounds normal. No respiratory distress. He has no wheezes. He has no rales.   Abdominal: Soft. Bowel sounds are normal. He exhibits distension. There is no tenderness.   Musculoskeletal: He exhibits edema.   Neurological: He is alert. No cranial nerve deficit. He exhibits normal muscle tone.   Skin: Skin is warm and dry. He is not diaphoretic.   Psychiatric: He has a normal mood and affect. His behavior is normal.       Medication List:  Current Outpatient Prescriptions   Medication Sig     acetaminophen (TYLENOL) 500 MG tablet Take 2 tablets (1,000 mg total) by mouth 3 (three) times a day.     allopurinol (ZYLOPRIM) 300 MG tablet Take 300 mg by mouth daily.      ascorbic acid, vitamin C, (VITAMIN C) 500 MG tablet Take 500 mg by mouth  daily.     aspirin 325 MG tablet Take 1 tablet (325 mg total) by mouth 2 (two) times a day.     furosemide (LASIX) 20 MG tablet Take 20 mg by mouth daily.     Lactobacillus rhamnosus GG (CULTURELLE) 10-15 Billion cell capsule Take 1 capsule by mouth daily.     lisinopril (PRINIVIL,ZESTRIL) 10 MG tablet Take 2 tablets (20 mg total) by mouth daily.     magnesium oxide 500 mg Tab Take 500 mg by mouth 2 (two) times a day.     multivitamin therapeutic (THERAGRAN) tablet Take 1 tablet by mouth daily.     oxyCODONE (ROXICODONE) 5 MG immediate release tablet Take 1-2 tablets (5-10 mg total) by mouth every 4 (four) hours as needed for pain.     SALMON OIL/OMEGA-3 FATTY ACIDS (SALMON OIL-1000 ORAL) Take 1 capsule by mouth daily.      senna-docusate (PERICOLACE) 8.6-50 mg tablet Take 1 tablet by mouth 2 (two) times a day.     sotalol (BETAPACE) 120 MG tablet Take 1 tablet (120 mg total) by mouth 2 (two) times a day.     tamsulosin (FLOMAX) 0.4 mg cap Take 1 capsule (0.4 mg total) by mouth daily after supper.       Labs: Laboratory values from 9/17/2018 as follows sodium was 139, potassium is 4.3, chloride was 105, CO2 was 26, glucose is 95, calcium was 8.8, BUN was 19, creatinine was 0.83, GFR was greater than 60.  Phosphorus is 4.3, magnesium is 1.5, white count was 7.1, hemoglobin was 7.7, platelet's were 151,000.      Assessment:    ICD-10-CM    1. Humeral fracture S42.309A    2. S/P ORIF (open reduction internal fixation) fracture Z96.7     Z87.81    3. Pain management R52    4. CHF (congestive heart failure) (H) I50.9    5. Lymphedema I89.0        Plan: Incision is looking good so no new changes there will continue with wound management.  His pain is well-managed at this time and CHF seems to be under pretty decent control we are getting lymphedema wraps for his lower extremities bilateral and they are coming in today at 2:00.  We will continue with physical and Occupational Therapy no other changes to care plan at this  time.  Care plan reviewed and is appropriate.  We will get a magnesium and hemoglobin today secondary to low hemoglobin in the hospital and low magnesium in the hospital.        Electronically signed by: Wu Liriano DO

## 2021-06-20 NOTE — PROGRESS NOTES
Code Status:  FULL CODE  Visit Type: Problem Visit     Facility:  [facility] [[id number]]        Facility Type: SNF (Skilled Nursing Facility, TCU)    History of Present Illness: Jairon Gomez is a 72 y.o. male who I am seeing today for follow-up on the TCU.  Patient with history of hx of stable cirrhosis, DM2, HTN, morbid obesity,  chronic CHF, atrial fibrillation (chronic) w recent pacer, recently hospitalized on 8/20/18 secondary to fall sustaining a right comminuted Humeral fracturewith radial nerve palsy.  Ortho-Est consulted and nonsurgical management was initiated including humeral fracture brace and wrist support.  He also continues in a shoulder immobilizer.  There was initial concern for compartment syndrome given anticoagulation.  His hemoglobin dropped 2 g.  His Xarelto was stopped will be assessed at his next Orth of follow-up on 8/28.  At that time they will also attempt manual alignment.  If unsuccessful patient may have to undergo ORIF.  Initially patient had hypotension in lieu of chronic hypertension secondary to hypovolemia and acute blood loss anemia.  He did receive 1 unit of packed red blood cells.  His sotalol was held initially however resumed at discharge.  He did undergo urinalysis and renal ultrasound secondary to his AK I.  Lasix and lisinopril were initially held however resumed upon discharge.  He did initially have leukocytosis with a lactic acid of 3.5.  He was initially treated with Zosyn for 48 hours however this was stopped due to negative cultures.  He also had a negative chest x-ray.  Chronic diastolic congestive heart failure.  Continues with chronic lymphedema.  Last echo reviewed.  Normal ejection fraction of 65% with grade 2 diastolic dysfunction.  Atrial fib with tachybradycardia syndrome.  Does have pacemaker.  Is rate controlled with sotalol.  He is normally on chronic anticoagulation with Xarelto.  This is currently being held.  Hepatic encephalopathy  acute on chronic.  He does continue on lactulose.  He did undergo head CT which was negative.  Urinary retention.  He was started on Flomax.  Renal ultrasound negative for hydronephrosis.  Diabetes mellitus type 2.  Diet controlled.  Hemoglobin A1c 5.2.  Pain well-controlled with oxycodone.  Continues with moderate edema to the right hand.  Radial nerve palsy present.  Continues in splinting both upper and wrist as well as swelling.  Moderate lymphedema.      Today nursing staff report increased bleeding from the distal aspect of pt incision. Pt did have a fall over the weekend in which he was leaning over to  something he dropped and tumbled forward from a seated position. He has denied any injury at the time. He was placed on Keflex over the weekend due to drainage and increased redness to his arm. A wound culture was taken. Today he has moderate sanguinous drainage from the distal aspect. Arm is very ecchymotic with swelling. The brace seems to be cording his arm. He continues in sling. He denies any increased pain and reports pain is per usual. He does have edema glove on and wrist splint distally.         Past Medical History:   Diagnosis Date     KHOI (acute kidney injury) (H)      Alcohol abuse, in remission      Anemia due to blood loss, acute      Ascites      Back pain     5 spurs in back     Chronic diastolic CHF (congestive heart failure) (H)      Diabetes (H)      Essential hypertension      Exertional dyspnea      Gastric ulcer 9/12/2015     GERD (gastroesophageal reflux disease)      Gout      HLD (hyperlipidemia)      Infectious viral hepatitis     ALCOHOLIC CIRRHOSIS     Lymphedema      Obesity      Osteoarthritis      Pacemaker      PAF (paroxysmal atrial fibrillation) (H)      Prostate cancer (H)      Psoriasis      Right humeral fracture      Skull fracture and subdural hematoma 2002    as a result of loss of consciousness and [place] the [place]; negative workup during one week  hospital stay with separate neuro and EP workup in [place] after return     Sleep apnea     no cpap recommended     SSS (sick sinus syndrome) (H)      Past Surgical History:   Procedure Laterality Date     BACK SURGERY  2011    for herniated disc, L3-L4     CARDIAC PACEMAKER PLACEMENT       CENTRAL VENOUS CATHETER INSERTION  08/24/2018    Non-tunneled - at [facility]     ESOPHAGOGASTRODUODENOSCOPY N/A 9/9/2015    Procedure: ESOPHAGOGASTRODUODENOSCOPY;  Surgeon: Karson Bauman MD;  Location: [facility] GI;  Service:      FOOT ARTHRODESIS Left 1/23/2018    Procedure: LEFT MIDFOOT FUSION WITH ALLOGRAFT;  Surgeon: Carey Ballesteros MD;  Location: [facility] Main OR;  Service:      OH EDG US EXAM SURGICAL ALTER STOM DUODENUM/JEJUNUM N/A 11/16/2015    Procedure: ESOPHAGOGASTRODUODENOSCOPY/ENDOSCOPIC ULTRASOUND;  Surgeon: Juan Diego Gonzalez MD;  Location: [facility];  Service: Gastroenterology     PROSTATECTOMY  1/26/2001    with bilateral lymph node dissection     TONSILLECTOMY       Family History   Problem Relation Age of Onset     Hepatitis Sister      Hepatitis C     Diabetes Brother      Heart disease Brother      Heart disease Brother      Heart disease Brother      Cancer Sister      Diabetes Sister      Alzheimer's disease Mother      Stroke Father 76     Social History     Social History     Marital status:      Spouse name: Cecilia     Number of children: N/A     Years of education: N/A     Occupational History     Not on file.     Social History Main Topics     Smoking status: Former Smoker     Smokeless tobacco: Never Used     Alcohol use No      Comment: Hx alcohol abuse- sober x 3 yrs     Drug use: No     Sexual activity: Not on file     Other Topics Concern     Not on file     Social History Narrative     Current Outpatient Prescriptions   Medication Sig Dispense Refill     levoFLOXacin (LEVAQUIN) 500 MG tablet Take 500 mg by mouth daily.       acetaminophen (TYLENOL) 500 MG tablet Take 2  tablets (1,000 mg total) by mouth 3 (three) times a day.  0     allopurinol (ZYLOPRIM) 300 MG tablet Take 300 mg by mouth daily.        ascorbic acid, vitamin C, (VITAMIN C) 500 MG tablet Take 500 mg by mouth daily.       aspirin 325 MG tablet Take 1 tablet (325 mg total) by mouth 2 (two) times a day.  0     furosemide (LASIX) 20 MG tablet Take 20 mg by mouth daily.       Lactobacillus rhamnosus GG (CULTURELLE) 10-15 Billion cell capsule Take 1 capsule by mouth daily.       lisinopril (PRINIVIL,ZESTRIL) 10 MG tablet Take 2 tablets (20 mg total) by mouth daily. 60 tablet 0     magnesium oxide 500 mg Tab Take 500 mg by mouth 2 (two) times a day.       multivitamin therapeutic (THERAGRAN) tablet Take 1 tablet by mouth daily.       oxyCODONE (ROXICODONE) 5 MG immediate release tablet Take 1-2 tablets (5-10 mg total) by mouth every 4 (four) hours as needed for pain. 40 tablet 0     SALMON OIL/OMEGA-3 FATTY ACIDS (SALMON OIL-1000 ORAL) Take 1 capsule by mouth daily.        senna-docusate (PERICOLACE) 8.6-50 mg tablet Take 1 tablet by mouth 2 (two) times a day.  0     sotalol (BETAPACE) 120 MG tablet Take 1 tablet (120 mg total) by mouth 2 (two) times a day. 180 tablet 5     tamsulosin (FLOMAX) 0.4 mg cap Take 1 capsule (0.4 mg total) by mouth daily after supper. 30 capsule 0     No current facility-administered medications for this visit.      Allergies   Allergen Reactions     Lipitor [Atorvastatin] Other (See Comments)     Had GI ulcer and could have been from Lipitor vs NSAIDs     Nsaids (Non-Steroidal Anti-Inflammatory Drug) Other (See Comments)     ULCER HX       Review of Systems   No fevers or chills. No headache, lightheadedness or dizziness. Chronic SOB with activity,  no chest pains or palpitations. Appetite is good. No nausea, vomiting, constipation or diarrhea. No dysuria, frequency, burning or pain with urination.  Pain well-controlled oxycodone.  Chronic lymphedema.  Edema to upper extremity. Recent fall  without injury. He is having increased bleeding from the distal part of his incision to right arm.  Otherwise review of systems are negative      Physical Exam   PHYSICAL EXAMINATION:  Vital signs: /69, heart rate 67, respirations 16, temperature 98.2, O2 sat 97% on room air.  Current weight 312.4 pounds.  General: Awake, Alert, oriented x3, appropriately, follows simple commands, conversant  HEENT:PERRLA, Pink conjunctiva, anicteric sclerae, moist oral mucosa. [place] with aide.   NECK: Supple, without masses  BACK: No kyphosis of the thoracic spine  ABDOMEN: Soft, obese, nontender to palpation, with positive bowel sounds  EXTREMITIES:  Moves both upper and lower extremities with limitation on the right upper extremity, right wrist splint to right upper extremity as well as sling.  He continues with  moderate edema 3+ in the right hand. Edema sleeve on. Radial nerve palsy to right upper extremity.  SKIN: Incision to RUE draining a moderate amount of sanguinous drainage from the distal aspect. Arm very ecchymotic, slightly warm to touch with moderate edema. Feels as if hematoma is under incision line.    NEUROLOGIC: Intact, pulses faint secondary to edema.  PSYCHIATRIC: Cognition intact.  Flat affect.         Labs:  All labs reviewed in the nursing home record.   Results for orders placed or performed in visit on 09/17/18   Basic Metabolic Panel   Result Value Ref Range    Sodium 139 136 - 145 mmol/L    Potassium 4.3 3.5 - 5.0 mmol/L    Chloride 105 98 - 107 mmol/L    CO2 26 22 - 31 mmol/L    Anion Gap, Calculation 8 5 - 18 mmol/L    Glucose 95 70 - 125 mg/dL    Calcium 8.8 8.5 - 10.5 mg/dL    BUN 19 8 - 28 mg/dL    Creatinine 0.83 0.70 - 1.30 mg/dL    GFR MDRD Af Amer >60 >60 mL/min/1.73m2    GFR MDRD Non Af Amer >60 >60 mL/min/1.73m2     Lab Results   Component Value Date    WBC 5.4 09/25/2018    HGB 7.7 (L) 09/25/2018    HCT 24.4 (L) 09/25/2018     (H) 09/25/2018     (L) 09/25/2018     Lab Results    Component Value Date    INR 2.27 (H) 08/23/2018    INR 2.51 (H) 08/20/2018    INR 1.58 (H) 01/16/2018       Assessment and plan:  1. Humeral fracture     2. S/P ORIF (open reduction internal fixation) fracture     3. Wound drainage     4. Blood loss anemia       S/P  ORIF of the right upper extremity secondary to humeral fracture.  Patient continues in immobilizer. He had a fall over the weekend. He denied any further injury however now with moderate amount of sanginous drainage from distal aspect of his RUE incision. Feels as if large hematoma under incision line. Moderate bruising. He also has moderate edema with splint cording his upper extremity. I have removed this. He was started on Keflex over the weekend. He is a-febrile. Wound Cx obtained. I will obtain xray. I will also obtain CBC and bmp.   Continue with pressure dressing and icing. I will also hold his ASA.     Electronically signed by: Nancy Ley, GLADYS

## 2021-06-20 NOTE — PROGRESS NOTES
[facility]      Facility:    [facility] [place], operated by [facility] [[id number]]  Code Status: UNKNOWN      Chief Complaint/Reason for Visit:  Chief Complaint   Patient presents with     H & P     Fall with left humerus fracture, acute kidney injury, hypotension, leukocytosis without sepsis, acute blood loss anemia, chronic diastolic congestive heart failure, atrial fibrillation, encephalopathy acute on chronic, type 2 diabetes, essential hypertension, morbid obesity.       HPI:   Jairon is a 72 y.o. male who was recently admitted to the hospital 8/20/2018.  He does with significant past medical history of congestive heart failure atrial fibrillation recent pacemaker placed.  He does have hypertension and diabetes which is well controlled last A1c of 5.2.  He was admitted secondary to a fall which was deemed mechanical by evaluation in the emergency room and in the hospital.  He did have a right humerus fracture and with radial nerve palsy and he was evaluated by PT OT.  He also saw orthopedic surgery and neurosurgery and patient was put in a brace with wrist support.  He is using right arm brace and shoulder immobilizer not an option secondary to habitus.  There was initial concern for compartment syndrome given anticoagulation hemoglobin did Drog[place] dropped 2 points.  He did stop the Xarelto and will resume after patient is seen orthopedics.  He also had hypotension that did resolve and was likely did have acute kidney injury.  Creatinine did return to normal range of the day of discharge 0.82 but was high as 1.7.  Urinalysis renal ultrasound were reassuring in the hospital and lisinopril and Lasix were held.  The resumed upon discharge and is currently on these medications.  He is on a 2500 cc fluid restriction.  He does have a history of cirrhosis which is stable.  He did have a leukocytosis which was concern for sepsis however blood cultures and all cultures were negative and he had negative chest  x-ray.  He did have a drop in his hemoglobin which will recheck here in the TCU and his chronic diastolic congestive heart failure appeared to be compensated.  He does have chronic 2-3+ pitting lower extremity edema.  His encephalopathy did get better he does have acute on chronic and is on lactulose at home.  It was suspected that opioids are contributing to this and this did improve and his type 2 diabetes is well controlled.  He was treated appropriately and transferred here to the TCU [facility] in stable condition.    Patient's main complaint today is pain is been well controlled however he has not had a bowel movement for some time.  He claims his about Sunday was last bowel movement that was 3 days ago.  He does have swelling of his right arm and he does have some pain but he is well-controlled with medications.  His weight has gone down 2 pounds in a day and he continues to be on 20 mg of Lasix daily.  His blood pressure somewhat low and he does not appear to be encephalopathic today.  No signs of any infection.    Past Medical History:  Past Medical History:   Diagnosis Date     Alcohol abuse, in remission      Anemia due to blood loss, acute      Ascites      Back pain     5 spurs in back     Chronic diastolic CHF (congestive heart failure) (H)      Diabetes (H)      Essential hypertension      Exertional dyspnea      Foot fracture, left      Gastric ulcer 9/12/2015     GERD (gastroesophageal reflux disease)      Gout      HLD (hyperlipidemia)      Infectious viral hepatitis     ALCOHOLIC CIRRHOSIS     Obesity      Osteoarthritis      Pacemaker      PAF (paroxysmal atrial fibrillation) (H)      Prostate cancer (H)      Psoriasis      Skull fracture and subdural hematoma 2002    as a result of loss of consciousness and fall at the [place]; negative workup during one week hospital stay with separate neuro and EP workup in [place] after return     Sleep apnea      SSS (sick sinus  syndrome) (H)            Surgical History:  Past Surgical History:   Procedure Laterality Date     BACK SURGERY  2011    for herniated disc, L3-L4     CARDIAC PACEMAKER PLACEMENT       ESOPHAGOGASTRODUODENOSCOPY N/A 9/9/2015    Procedure: ESOPHAGOGASTRODUODENOSCOPY;  Surgeon: Karson Bauman MD;  Location: [facility] GI;  Service:      FOOT ARTHRODESIS Left 1/23/2018    Procedure: LEFT MIDFOOT FUSION WITH ALLOGRAFT;  Surgeon: Carey Ballesteros MD;  Location: [facility] Main OR;  Service:      [place] EDG US EXAM SURGICAL ALTER STOM DUODENUM/JEJUNUM N/A 11/16/2015    Procedure: ESOPHAGOGASTRODUODENOSCOPY/ENDOSCOPIC ULTRASOUND;  Surgeon: Juan Diego Gonzalez MD;  Location: [facility] GI;  Service: Gastroenterology     PROSTATECTOMY  1/26/2001    with bilateral lymph node dissection     TONSILLECTOMY         Family History:   Family History   Problem Relation Age of Onset     Hepatitis Sister      Hepatitis C     Diabetes Brother      Heart disease Brother      Heart disease Brother      Heart disease Brother      Cancer Sister      Diabetes Sister      Alzheimer's disease Mother      Stroke Father 76       Social History:    Social History     Social History     Marital status:      Spouse name: Cecilia     Number of children: N/A     Years of education: N/A     Social History Main Topics     Smoking status: Former Smoker     Smokeless tobacco: Never Used     Alcohol use No     Drug use: No     Sexual activity: Not Asked     Other Topics Concern     None     Social History Narrative          Review of Systems   Constitutional:        Review of systems positive for constipation no bowel movement for 3 days.  His pain is well controlled he does have lower extremity edema and he says that is chronic for him.  His weight has gone down 2 pounds since yesterday and he denies any fevers chills nausea vomiting diarrhea change in vision hearing taste or smell weakness one side or the other chest pain or shortness of breath.   He is urinating okay at this time despite needing a Matthews catheter in the hospital and the remainder the review of systems is negative.       Vitals:    08/29/18 1106   BP: 102/62   Pulse: 63   Resp: 18   Temp: 98  F (36.7  C)   SpO2: 96%       Physical Exam   Constitutional: No distress.   HENT:   Head: Normocephalic and atraumatic.   Nose: Nose normal.   Cardiovascular: Normal rate and regular rhythm.    Pulmonary/Chest: Effort normal and breath sounds normal. No respiratory distress. He has no wheezes. He has no rales.   Abdominal: Soft. Bowel sounds are normal. He exhibits distension. There is no tenderness. There is no rebound.   Musculoskeletal: He exhibits edema.   Neurological: He is alert. No cranial nerve deficit. He exhibits normal muscle tone.   Skin: Skin is warm and dry. He is not diaphoretic.   Psychiatric: He has a normal mood and affect. His behavior is normal.       Medication List:  Current Outpatient Prescriptions   Medication Sig     acetaminophen (TYLENOL) 500 MG tablet Take 1 tablet (500 mg total) by mouth every 6 (six) hours as needed for pain or fever.     allopurinol (ZYLOPRIM) 300 MG tablet Take 300 mg by mouth daily.      ascorbic acid, vitamin C, (VITAMIN C) 500 MG tablet Take 500 mg by mouth daily.     aspirin 81 mg chewable tablet Chew 1 tablet (81 mg total) daily.     furosemide (LASIX) 20 MG tablet Take 20 mg by mouth daily.     Lactobacillus rhamnosus GG (CULTURELLE) 10-15 Billion cell capsule Take 1 capsule by mouth daily.     Lactobacillus rhamnosus GG (CULTURELLE) 15 billion cell CpSP Take 1 capsule by mouth 3 (three) times a day with meals.     lactulose (ENULOSE) 20 gram/30 mL Soln solution Take 5 mL by mouth daily. Taking 1 teaspoonful daily     lisinopril (PRINIVIL,ZESTRIL) 10 MG tablet Take 2 tablets (20 mg total) by mouth daily.     magnesium oxide (MAG-OX) 400 mg tablet Take 500 mg by mouth 2 (two) times a day.      multivitamin therapeutic (THERAGRAN) tablet Take 1 tablet  by mouth daily.     oxyCODONE (ROXICODONE) 5 MG immediate release tablet Take 1-2 tablets (5-10 mg total) by mouth every 4 (four) hours as needed.     SALMON OIL/OMEGA-3 FATTY ACIDS (SALMON OIL-1000 ORAL) Take 1 capsule by mouth daily.      sotalol (BETAPACE) 120 MG tablet Take 1 tablet (120 mg total) by mouth 2 (two) times a day.     tamsulosin (FLOMAX) 0.4 mg cap Take 1 capsule (0.4 mg total) by mouth daily after supper.       Labs: Operatory values from the hospital or as follows most critical was creatinine was up to 1.7 then went down to 0.8 GFR was greater than 60 on discharge as well as a BUN of 26 and discharge.  Sodium was  135, potassium 4.7, calcium is 8.6, albumin was low.  White count was 9.9, hemoglobin was 9.6, platelets 120,000.  Blood cultures were negative.      Assessment:    ICD-10-CM    1. Humeral fracture S42.309A    2. Lymphedema I89.0    3. CHF (congestive heart failure) (H) I50.9    4. Hepatic encephalopathy (H) K72.90    5. Atrial fibrillation (H) I48.91    6. Pain management R52    7. Diabetes mellitus (H) E11.9    8. Acute kidney injury (H) N17.9    9. Blood loss anemia D50.0        Plan: At this time hemoglobin stat today secondary to blood loss anemia we will continue to monitor that weights to be done a daily basis will continue his diuretic at same dose.  Will check a basic metabolic profile today secondary to acute kidney injury and will get a Dulcolax suppository today secondary to no bowel movement in 3 days.  He will follow-up with orthopedics next week and we will hold the Xarelto at this time until we get hemoglobin and follow-up with orthopedic surgery.  No changes in his diabetic medications at this time and he does not appear to be encephalopathic at this time.  Pain is well-managed according to him.  I will continue to monitor above medical problems and no other changes to care plan at this time.        Electronically signed by: Wu Liriano DO

## 2021-06-20 NOTE — PROGRESS NOTES
Code Status:  FULL CODE  Visit Type: Problem Visit     Facility:  [facility] [[id number]]        Facility Type: SNF (Skilled Nursing Facility, TCU)    History of Present Illness: Jairon Gomez is a 72 y.o. male who I am seeing today for follow-up on the TCU.  Patient with history of hx of stable cirrhosis, DM2, HTN, morbid obesity,  chronic CHF, atrial fibrillation (chronic) w recent pacer, recently hospitalized on 8/20/18 secondary to fall sustaining a right comminuted Humeral fracturewith radial nerve palsy.  Ortho-Est consulted and nonsurgical management was initiated including humeral fracture brace and wrist support.  He also continues in a shoulder immobilizer.  There was initial concern for compartment syndrome given anticoagulation.  His hemoglobin dropped 2 g.  His Xarelto was stopped will be assessed at his next Orth of follow-up on 8/28.  At that time they will also attempt manual alignment.  If unsuccessful patient may have to undergo ORIF.  Initially patient had hypotension in lieu of chronic hypertension secondary to hypovolemia and acute blood loss anemia.  He did receive 1 unit of packed red blood cells.  His sotalol was held initially however resumed at discharge.  He did undergo urinalysis and renal ultrasound secondary to his AK I.  Lasix and lisinopril were initially held however resumed upon discharge.  He did initially have leukocytosis with a lactic acid of 3.5.  He was initially treated with Zosyn for 48 hours however this was stopped due to negative cultures.  He also had a negative chest x-ray.  Chronic diastolic congestive heart failure.  Continues with chronic lymphedema.  Last echo reviewed.  Normal ejection fraction of 65% with grade 2 diastolic dysfunction.  Atrial fib with tachybradycardia syndrome.  Does have pacemaker.  Is rate controlled with sotalol.  He is normally on chronic anticoagulation with Xarelto.  This is currently being held.  Hepatic encephalopathy  acute on chronic.  He does continue on lactulose.  He did undergo head CT which was negative.  Urinary retention.  He was started on Flomax.  Renal ultrasound negative for hydronephrosis.  Diabetes mellitus type 2.  Diet controlled.  Hemoglobin A1c 5.2.  Pain well-controlled with oxycodone.  Continues with moderate edema to the right hand.  Radial nerve palsy present.  Continues in splinting both upper and wrist as well as swelling.  Moderate lymphedema.      Today pt sitting up in bedside chair.  His wife is present on the visit.  Recent x-ray showed separation of the plate and screws from the distal aspect of the proximal fracture site.  He was seen by Ortho.  He continues in splinting.  They will re-x-ray in 10 days.  He will most likely need surgical repair.  I have been holding his aspirin.  No bleeding at the site today.  Continues in immobilizer and sling.  Pain well-controlled with oxycodone.  Moderate edema and bruising noted.  He does have a edema sleeve in place.    Past Medical History:   Diagnosis Date     KHOI (acute kidney injury) (H)      Alcohol abuse, in remission      Anemia due to blood loss, acute      Ascites      Back pain     5 spurs in back     Chronic diastolic CHF (congestive heart failure) (H)      Diabetes (H)      Essential hypertension      Exertional dyspnea      Gastric ulcer 9/12/2015     GERD (gastroesophageal reflux disease)      Gout      HLD (hyperlipidemia)      Infectious viral hepatitis     ALCOHOLIC CIRRHOSIS     Lymphedema      Obesity      Osteoarthritis      Pacemaker      PAF (paroxysmal atrial fibrillation) (H)      Prostate cancer (H)      Psoriasis      Right humeral fracture      Skull fracture and subdural hematoma 2002    as a result of loss of consciousness and [place]; negative workup during one week hospital stay with separate neuro and EP workup in [place] after return     Sleep apnea     no cpap recommended     SSS (sick sinus syndrome)  (H)      Past Surgical History:   Procedure Laterality Date     BACK SURGERY  2011    for herniated disc, L3-L4     CARDIAC PACEMAKER PLACEMENT       CENTRAL VENOUS CATHETER INSERTION  08/24/2018    Non-tunneled - at [facility]     ESOPHAGOGASTRODUODENOSCOPY N/A 9/9/2015    Procedure: ESOPHAGOGASTRODUODENOSCOPY;  Surgeon: Karson Bauman MD;  Location: [facility] GI;  Service:      FOOT ARTHRODESIS Left 1/23/2018    Procedure: LEFT MIDFOOT FUSION WITH ALLOGRAFT;  Surgeon: Carey Ballesteros MD;  Location: [facility] Main OR;  Service:      KS EDG US EXAM SURGICAL ALTER STOM DUODENUM/JEJUNUM N/A 11/16/2015    Procedure: ESOPHAGOGASTRODUODENOSCOPY/ENDOSCOPIC ULTRASOUND;  Surgeon: Juan Diego Gonzalez MD;  Location: [facility] GI;  Service: Gastroenterology     PROSTATECTOMY  1/26/2001    with bilateral lymph node dissection     TONSILLECTOMY       Family History   Problem Relation Age of Onset     Hepatitis Sister      Hepatitis C     Diabetes Brother      Heart disease Brother      Heart disease Brother      Heart disease Brother      Cancer Sister      Diabetes Sister      Alzheimer's disease Mother      Stroke Father 76     Social History     Social History     Marital status:      Spouse name: Cecilia     Number of children: N/A     Years of education: N/A     Occupational History     Not on file.     Social History Main Topics     Smoking status: Former Smoker     Smokeless tobacco: Never Used     Alcohol use No      Comment: Hx alcohol abuse- sober x 3 yrs     Drug use: No     Sexual activity: Not on file     Other Topics Concern     Not on file     Social History Narrative     Current Outpatient Prescriptions   Medication Sig Dispense Refill     acetaminophen (TYLENOL) 500 MG tablet Take 2 tablets (1,000 mg total) by mouth 3 (three) times a day.  0     allopurinol (ZYLOPRIM) 300 MG tablet Take 300 mg by mouth daily.        ascorbic acid, vitamin C, (VITAMIN C) 500 MG tablet Take 500 mg by mouth daily.        aspirin 325 MG tablet Take 1 tablet (325 mg total) by mouth 2 (two) times a day.  0     furosemide (LASIX) 20 MG tablet Take 20 mg by mouth daily.       Lactobacillus rhamnosus GG (CULTURELLE) 10-15 Billion cell capsule Take 1 capsule by mouth daily.       levoFLOXacin (LEVAQUIN) 500 MG tablet Take 500 mg by mouth daily.       lisinopril (PRINIVIL,ZESTRIL) 10 MG tablet Take 2 tablets (20 mg total) by mouth daily. 60 tablet 0     magnesium oxide 500 mg Tab Take 500 mg by mouth 2 (two) times a day.       multivitamin therapeutic (THERAGRAN) tablet Take 1 tablet by mouth daily.       oxyCODONE (ROXICODONE) 5 MG immediate release tablet Take 1-2 tablets (5-10 mg total) by mouth every 4 (four) hours as needed for pain. 40 tablet 0     SALMON OIL/OMEGA-3 FATTY ACIDS (SALMON OIL-1000 ORAL) Take 1 capsule by mouth daily.        senna-docusate (PERICOLACE) 8.6-50 mg tablet Take 1 tablet by mouth 2 (two) times a day.  0     sotalol (BETAPACE) 120 MG tablet Take 1 tablet (120 mg total) by mouth 2 (two) times a day. 180 tablet 5     tamsulosin (FLOMAX) 0.4 mg cap Take 1 capsule (0.4 mg total) by mouth daily after supper. 30 capsule 0     No current facility-administered medications for this visit.      Allergies   Allergen Reactions     Lipitor [Atorvastatin] Other (See Comments)     Had GI ulcer and could have been from Lipitor vs NSAIDs     Nsaids (Non-Steroidal Anti-Inflammatory Drug) Other (See Comments)     ULCER HX       Review of Systems   No fevers or chills. No headache, lightheadedness or dizziness. Chronic SOB with activity,  no chest pains or palpitations. Appetite is good. No nausea, vomiting, constipation or diarrhea. No dysuria, frequency, burning or pain with urination.  Pain well-controlled oxycodone.  Chronic lymphedema.  Edema to upper extremity. Recent fall without injury. He is having increased bleeding from the distal part of his incision to right arm.  Otherwise review of systems are  negative      Physical Exam   PHYSICAL EXAMINATION:  Vital signs: /67, heart rate 70, respirations 16, O2 sat 97%, temperature 97, current weight 312 pounds.    General: Awake, Alert, oriented x3, appropriately, follows simple commands, conversant  HEENT:PERRLA, Pink conjunctiva, anicteric sclerae, moist oral mucosa. [place] with aide.   NECK: Supple, without masses  BACK: No kyphosis of the thoracic spine  ABDOMEN: Soft, obese, nontender to palpation, with positive bowel sounds  EXTREMITIES:  Moves both upper and lower extremities with limitation on the right upper extremity, right wrist splint to right upper extremity as well as sling.  He continues with  moderate edema 3+ in the right hand. Edema sleeve on. Radial nerve palsy to right upper extremity.  SKIN: Incision to RUE now dry.  Arm very ecchymotic. No hardware palpable.   NEUROLOGIC: Intact, pulses faint secondary to edema.  PSYCHIATRIC: Cognition intact.  Flat affect.         Labs:  All labs reviewed in the nursing home record.   Results for orders placed or performed in visit on 09/25/18   Basic Metabolic Panel   Result Value Ref Range    Sodium 138 136 - 145 mmol/L    Potassium 4.2 3.5 - 5.0 mmol/L    Chloride 105 98 - 107 mmol/L    CO2 26 22 - 31 mmol/L    Anion Gap, Calculation 7 5 - 18 mmol/L    Glucose 167 (H) 70 - 125 mg/dL    Calcium 8.8 8.5 - 10.5 mg/dL    BUN 14 8 - 28 mg/dL    Creatinine 0.83 0.70 - 1.30 mg/dL    GFR MDRD Af Amer >60 >60 mL/min/1.73m2    GFR MDRD Non Af Amer >60 >60 mL/min/1.73m2     Lab Results   Component Value Date    WBC 5.4 09/25/2018    HGB 7.6 (L) 09/27/2018    HCT 24.4 (L) 09/25/2018     (H) 09/25/2018     (L) 09/25/2018     Lab Results   Component Value Date    INR 2.27 (H) 08/23/2018    INR 2.51 (H) 08/20/2018    INR 1.58 (H) 01/16/2018       Assessment and plan:  1. Humeral fracture     2. S/P ORIF (open reduction internal fixation) fracture     3. Wound infection     4. Blood loss anemia     5. Atrial  fibrillation (H)     6. Lymphedema       S/P  ORIF of the right upper extremity secondary to humeral fracture.  Recent x-ray showed separation of the plate and screws from the distal aspect of the proximal fracture site.  He was seen by Joy MORTON.  They will see him again in 10 days and re-x-ray.  He will most likely need surgical intervention.  He continues in splinting as well as sling.  Moderate bruising.  No further drainage.  He does continue on oral antibiotics.  He did culture positive for staph.  I did hold his aspirin times 3 days.  No further bleeding.  Will resume at 325 daily.  Moderate edema.  Edema glove in place.  He does have radial nerve palsy.  Hand splint in place as well.  Atrial fib.  Well controlled.  Acute blood loss anemia.  Hemoglobin 7.6.  Hemodynamically stable.  We will repeat hemoglobin on Monday.  Lymphedema.  Will write for [facility] to evaluate for custom compression.       Electronically signed by: Nancy Ley, GLADYS

## 2021-06-20 NOTE — PROGRESS NOTES
Code Status:  FULL CODE  Visit Type: Problem Visit     Facility:  [facility] [[id number]]        Facility Type: SNF (Skilled Nursing Facility, TCU)    History of Present Illness: Jairon Gomez is a 73 y.o. male who I am seeing today for follow-up on the TCU.  Patient with history of hx of stable cirrhosis, DM2, HTN, morbid obesity,  chronic CHF, atrial fibrillation (chronic) w recent pacer, recently hospitalized on 8/20/18 secondary to fall sustaining a right comminuted Humeral fracturewith radial nerve palsy.  Ortho-Est consulted and nonsurgical management was initiated including humeral fracture brace and wrist support.  He also continues in a shoulder immobilizer.  There was initial concern for compartment syndrome given anticoagulation.  His hemoglobin dropped 2 g.  His Xarelto was stopped will be assessed at his next Orth of follow-up on 8/28.  At that time they will also attempt manual alignment.  If unsuccessful patient may have to undergo ORIF.  Initially patient had hypotension in lieu of chronic hypertension secondary to hypovolemia and acute blood loss anemia.  He did receive 1 unit of packed red blood cells.  His sotalol was held initially however resumed at discharge.  He did undergo urinalysis and renal ultrasound secondary to his AK I.  Lasix and lisinopril were initially held however resumed upon discharge.  He did initially have leukocytosis with a lactic acid of 3.5.  He was initially treated with Zosyn for 48 hours however this was stopped due to negative cultures.  He also had a negative chest x-ray.  Chronic diastolic congestive heart failure.  Continues with chronic lymphedema.  Last echo reviewed.  Normal ejection fraction of 65% with grade 2 diastolic dysfunction.  Atrial fib with tachybradycardia syndrome.  Does have pacemaker.  Is rate controlled with sotalol.  He is normally on chronic anticoagulation with Xarelto.  This is currently being held.  Hepatic encephalopathy  acute on chronic.  He does continue on lactulose.  He did undergo head CT which was negative.  Urinary retention.  He was started on Flomax.  Renal ultrasound negative for hydronephrosis.  Diabetes mellitus type 2.  Diet controlled.  Hemoglobin A1c 5.2.  Pain well-controlled with oxycodone.  Continues with moderate edema to the right hand.  Radial nerve palsy present.  Continues in splinting both upper and wrist as well as swelling.  Moderate lymphedema.      Today pt sitting up in wheelchair.  Patient was seen by Joy O last week.  He will follow-up for repeat surgery with ORIF and hardware removal on 10/17/18.  He continues in splinting.  Pain well-controlled oxycodone.  Moderate edema in the right upper extremity.  Now weeping from his forearm.  He does have edema glove in place. Continues with radial nerve palsy.  Weight is trending upward.  Up about 6 pounds. Lymphedema to the lower extremities.  Now in Tubigrip.  Acute blood loss anemia.  Hemoglobin stable in the eights.  He does continue on iron.      Past Medical History:   Diagnosis Date     KHOI (acute kidney injury) (H)      Alcohol abuse, in remission      Anemia due to blood loss, acute      Ascites      Back pain     5 spurs in back     Chronic diastolic CHF (congestive heart failure) (H)      Diabetes (H)      Essential hypertension      Exertional dyspnea      Gastric ulcer 9/12/2015     GERD (gastroesophageal reflux disease)      Gout      HLD (hyperlipidemia)      Infectious viral hepatitis     ALCOHOLIC CIRRHOSIS     Lymphedema      Obesity      Osteoarthritis      Pacemaker      PAF (paroxysmal atrial fibrillation) (H)      Prostate cancer (H)      Psoriasis      Right humeral fracture      Skull fracture and subdural hematoma 2002    as a result of loss of consciousness and [place]; negative workup during one week hospital stay with separate neuro and EP workup in [place] after return     Sleep apnea     no cpap recommended      SSS (sick sinus syndrome) (H)      Past Surgical History:   Procedure Laterality Date     BACK SURGERY  2011    for herniated disc, L3-L4     CARDIAC PACEMAKER PLACEMENT       CENTRAL VENOUS CATHETER INSERTION  08/24/2018    Non-tunneled - at [facility]     ESOPHAGOGASTRODUODENOSCOPY N/A 9/9/2015    Procedure: ESOPHAGOGASTRODUODENOSCOPY;  Surgeon: Karson Bauman MD;  Location: [facility] GI;  Service:      FOOT ARTHRODESIS Left 1/23/2018    Procedure: LEFT MIDFOOT FUSION WITH ALLOGRAFT;  Surgeon: Carey Ballesteros MD;  Location: [facility] Main OR;  Service:      [place] EDG US EXAM SURGICAL ALTER STOM DUODENUM/JEJUNUM N/A 11/16/2015    Procedure: ESOPHAGOGASTRODUODENOSCOPY/ENDOSCOPIC ULTRASOUND;  Surgeon: Juan Diego Gonzalez MD;  Location: [facility] GI;  Service: Gastroenterology     PROSTATECTOMY  1/26/2001    with bilateral lymph node dissection     TONSILLECTOMY       Family History   Problem Relation Age of Onset     Hepatitis Sister      Hepatitis C     Diabetes Brother      Heart disease Brother      Heart disease Brother      Heart disease Brother      Cancer Sister      Diabetes Sister      Alzheimer's disease Mother      Stroke Father 76     Social History     Social History     Marital status:      Spouse name: Cecilia     Number of children: N/A     Years of education: N/A     Occupational History     Not on file.     Social History Main Topics     Smoking status: Former Smoker     Smokeless tobacco: Never Used     Alcohol use No      Comment: Hx alcohol abuse- sober x 3 yrs     Drug use: No     Sexual activity: Not on file     Other Topics Concern     Not on file     Social History Narrative     Current Outpatient Prescriptions   Medication Sig Dispense Refill     acetaminophen (TYLENOL) 500 MG tablet Take 2 tablets (1,000 mg total) by mouth 3 (three) times a day.  0     allopurinol (ZYLOPRIM) 300 MG tablet Take 300 mg by mouth daily.        ascorbic acid, vitamin C, (VITAMIN C) 500 MG tablet  Take 500 mg by mouth daily.       aspirin 325 MG tablet Take 1 tablet (325 mg total) by mouth 2 (two) times a day.  0     furosemide (LASIX) 20 MG tablet Take 20 mg by mouth daily.       Lactobacillus rhamnosus GG (CULTURELLE) 10-15 Billion cell capsule Take 1 capsule by mouth daily.       lisinopril (PRINIVIL,ZESTRIL) 10 MG tablet Take 2 tablets (20 mg total) by mouth daily. 60 tablet 0     magnesium oxide 500 mg Tab Take 500 mg by mouth 2 (two) times a day.       multivitamin therapeutic (THERAGRAN) tablet Take 1 tablet by mouth daily.       oxyCODONE (ROXICODONE) 5 MG immediate release tablet Take 1-2 tablets (5-10 mg total) by mouth every 4 (four) hours as needed for pain. 60 tablet 0     SALMON OIL/OMEGA-3 FATTY ACIDS (SALMON OIL-1000 ORAL) Take 1 capsule by mouth daily.        senna-docusate (PERICOLACE) 8.6-50 mg tablet Take 1 tablet by mouth 2 (two) times a day.  0     sotalol (BETAPACE) 120 MG tablet Take 1 tablet (120 mg total) by mouth 2 (two) times a day. 180 tablet 5     tamsulosin (FLOMAX) 0.4 mg cap Take 1 capsule (0.4 mg total) by mouth daily after supper. 30 capsule 0     No current facility-administered medications for this visit.      Allergies   Allergen Reactions     Lipitor [Atorvastatin] Other (See Comments)     Had GI ulcer and could have been from Lipitor vs NSAIDs     Nsaids (Non-Steroidal Anti-Inflammatory Drug) Other (See Comments)     ULCER HX       Review of Systems   No fevers or chills. No headache, lightheadedness or dizziness. Chronic SOB with activity,  no chest pains or palpitations. Appetite is good. No nausea, vomiting, constipation or diarrhea. No dysuria, frequency, burning or pain with urination.  Pain well-controlled oxycodone.  Chronic lymphedema.  Edema to upper extremity now with weeping. Otherwise review of systems are negative      Physical Exam   PHYSICAL EXAMINATION:  Vital signs: /61, heart rate 68, respirations 16, temperature 97.3, O2 sat 96% on room air.   Current weight 309 pounds.    General: Awake, Alert, oriented x3, appropriately, follows simple commands, conversant  HEENT:PERRLA, Pink conjunctiva, anicteric sclerae, moist oral mucosa. [place] with aide.   NECK: Supple, without masses  BACK: No kyphosis of the thoracic spine  ABDOMEN: Soft, obese, nontender to palpation, with positive bowel sounds  EXTREMITIES:  Moves both upper and lower extremities with limitation on the right upper extremity, right wrist splint to right upper extremity as well as sling.  He continues with  moderate edema 2+ in the right hand.  Weeping from his forearm.  Edema sleeve on. Radial nerve palsy to right upper extremity.  Moderate lymphedema with lymphedema wraps on.  No tenderness with palpitation of the right groin.  SKIN: Incision to RUE now dry.  Ecchymosis resolved.  NEUROLOGIC: Intact, pulses faint secondary to edema.  PSYCHIATRIC: Cognition intact.  Pleasant affect.        Labs:  All labs reviewed in the nursing home record.   Results for orders placed or performed in visit on 09/25/18   Basic Metabolic Panel   Result Value Ref Range    Sodium 138 136 - 145 mmol/L    Potassium 4.2 3.5 - 5.0 mmol/L    Chloride 105 98 - 107 mmol/L    CO2 26 22 - 31 mmol/L    Anion Gap, Calculation 7 5 - 18 mmol/L    Glucose 167 (H) 70 - 125 mg/dL    Calcium 8.8 8.5 - 10.5 mg/dL    BUN 14 8 - 28 mg/dL    Creatinine 0.83 0.70 - 1.30 mg/dL    GFR MDRD Af Amer >60 >60 mL/min/1.73m2    GFR MDRD Non Af Amer >60 >60 mL/min/1.73m2     Lab Results   Component Value Date    WBC 5.4 09/25/2018    HGB 8.2 (L) 10/02/2018    HCT 24.4 (L) 09/25/2018     (H) 09/25/2018     (L) 09/25/2018     Lab Results   Component Value Date    INR 2.27 (H) 08/23/2018    INR 2.51 (H) 08/20/2018    INR 1.58 (H) 01/16/2018       Assessment and plan:  1. Humeral fracture     2. S/P ORIF (open reduction internal fixation) fracture     3. Atrial fibrillation (H)     4. Blood loss anemia     5. Lymphedema     6. Diabetes  mellitus (H)     7. CHF (congestive heart failure) (H)       S/P  ORIF of the right upper extremity secondary to humeral fracture.  Recent x-ray showed separation of the plate and screws from the distal aspect of the proximal fracture site.  He had a follow-up with Orth O last week.  He will undergo surgery again on 10/17/18.  Moderate hand edema with weeping of the forearm.  Continues in splinting an edema glove.  He does have underlying CHF.  He is up about 6 pounds.  He does continue on Lasix.  I will increase this times 2 days to 40 mg daily.  Will follow with a BMP on Thursday.  Continue daily weights.  Denies any increased shortness of breath or chest pain.  Diabetes.  Satisfactory controlled.  Lymphedema.  He will follow-up with children's outpatient only for compression wraps.  Continues in Tubigrip.  Pain well-controlled with oxycodone and Tylenol.  Acute blood loss anemia.  Hemoglobin stable in the eights.  Continues on iron.  We will follow-up with a hemoglobin on Thursday. Right groin pain resolved.    Electronically signed by: Nancy Ley, CNP

## 2021-06-20 NOTE — ANESTHESIA CARE TRANSFER NOTE
Last vitals:   Vitals:    09/11/18 1902   BP: 117/49   Pulse: 68   Resp: 20   Temp: 36.3  C (97.3  F)   SpO2: 100%     Patient's level of consciousness is awake  Spontaneous respirations: yes  Maintains airway independently: yes  Dentition unchanged: yes  Oropharynx: oropharynx clear of all foreign objects    QCDR Measures:  ASA# 20 - Surgical Safety Checklist: WHO surgical safety checklist completed prior to induction  PQRS# 430 - Adult PONV Prevention: 4558F - Pt received => 2 anti-emetic agents (different classes) preop & intraop  ASA# 8 - Peds PONV Prevention: NA - Not pediatric patient, not GA or 2 or more risk factors NOT present  PQRS# 424 - Romi-op Temp Management: 4559F - At least one body temp DOCUMENTED => 35.5C or 95.9F within required timeframe  PQRS# 426 - PACU Transfer Protocol: - Transfer of care checklist used  ASA# 14 - Acute Post-op Pain: ASA14B - Patient did NOT experience pain >= 7 out of 10

## 2021-06-20 NOTE — PROGRESS NOTES
Code Status:  FULL CODE  Visit Type: Problem Visit     Facility:  [facility] [[id number]]        Facility Type: SNF (Skilled Nursing Facility, TCU)    History of Present Illness: Jairon Gomez is a 72 y.o. male who I am seeing today for follow-up on the TCU.  Patient with history of hx of stable cirrhosis, DM2, HTN, morbid obesity,  chronic CHF, atrial fibrillation (chronic) w recent pacer, recently hospitalized on 8/20/18 secondary to fall sustaining a right comminuted Humeral fracturewith radial nerve palsy.  Ortho-Est consulted and nonsurgical management was initiated including humeral fracture brace and wrist support.  He also continues in a shoulder immobilizer.  There was initial concern for compartment syndrome given anticoagulation.  His hemoglobin dropped 2 g.  His Xarelto was stopped will be assessed at his next Orth of follow-up on 8/28.  At that time they will also attempt manual alignment.  If unsuccessful patient may have to undergo ORIF.  Initially patient had hypotension in lieu of chronic hypertension secondary to hypovolemia and acute blood loss anemia.  He did receive 1 unit of packed red blood cells.  His sotalol was held initially however resumed at discharge.  He did undergo urinalysis and renal ultrasound secondary to his AK I.  Lasix and lisinopril were initially held however resumed upon discharge.  He did initially have leukocytosis with a lactic acid of 3.5.  He was initially treated with Zosyn for 48 hours however this was stopped due to negative cultures.  He also had a negative chest x-ray.  Chronic diastolic congestive heart failure.  Continues with chronic lymphedema.  Last echo reviewed.  Normal ejection fraction of 65% with grade 2 diastolic dysfunction.  Atrial fib with tachybradycardia syndrome.  Does have pacemaker.  Is rate controlled with sotalol.  He is normally on chronic anticoagulation with Xarelto.  This is currently being held.  Hepatic encephalopathy  acute on chronic.  He does continue on lactulose.  He did undergo head CT which was negative.  Urinary retention.  He was started on Flomax.  Renal ultrasound negative for hydronephrosis.  Diabetes mellitus type 2.  Diet controlled.  Hemoglobin A1c 5.2.  Pain well-controlled with oxycodone.  Continues with moderate edema to the right hand.  Radial nerve palsy present.  Continues in splinting both upper and wrist as well as swelling.  Moderate lymphedema.      Today pt sitting up in bedside chair. Continues with splinting to the right upper extremity.  Incision dry and intact.  Pain well-controlled with oxycodone.  He does have radial palsy.  Edema clubbing or edema.  Chronic lymphedema with lymphedema therapy to bilateral lower extremities.  He is to be fitted for compression wraps.  Recent increased pain in his right groin.  I did order muscle rub topically.  No pain with ambulation sitting.  Only pain with straight leg raise.  No pain on palpitation.    Past Medical History:   Diagnosis Date     KHOI (acute kidney injury) (H)      Alcohol abuse, in remission      Anemia due to blood loss, acute      Ascites      Back pain     5 spurs in back     Chronic diastolic CHF (congestive heart failure) (H)      Diabetes (H)      Essential hypertension      Exertional dyspnea      Gastric ulcer 9/12/2015     GERD (gastroesophageal reflux disease)      Gout      HLD (hyperlipidemia)      Infectious viral hepatitis     ALCOHOLIC CIRRHOSIS     Lymphedema      Obesity      Osteoarthritis      Pacemaker      PAF (paroxysmal atrial fibrillation) (H)      Prostate cancer (H)      Psoriasis      Right humeral fracture      Skull fracture and subdural hematoma 2002    as a result of loss of [place] and [place]; negative workup during one week hospital stay with separate neuro and EP workup in [place] after return     Sleep apnea     no cpap recommended     SSS (sick sinus syndrome) (H)      Past Surgical  History:   Procedure Laterality Date     BACK SURGERY  2011    for herniated disc, L3-L4     CARDIAC PACEMAKER PLACEMENT       CENTRAL VENOUS CATHETER INSERTION  08/24/2018    Non-tunneled - at [facility]     ESOPHAGOGASTRODUODENOSCOPY N/A 9/9/2015    Procedure: ESOPHAGOGASTRODUODENOSCOPY;  Surgeon: Karson Bauman MD;  Location: [facility] GI;  Service:      FOOT ARTHRODESIS Left 1/23/2018    Procedure: LEFT MIDFOOT FUSION WITH ALLOGRAFT;  Surgeon: Carey Ballesteros MD;  Location: [facility] Main OR;  Service:      [place] EDG US EXAM SURGICAL ALTER STOM DUODENUM/JEJUNUM N/A 11/16/2015    Procedure: ESOPHAGOGASTRODUODENOSCOPY/ENDOSCOPIC ULTRASOUND;  Surgeon: Juan Diego Gonzalez MD;  Location: [facility] GI;  Service: Gastroenterology     PROSTATECTOMY  1/26/2001    with bilateral lymph node dissection     TONSILLECTOMY       Family History   Problem Relation Age of Onset     Hepatitis Sister      Hepatitis C     Diabetes Brother      Heart disease Brother      Heart disease Brother      Heart disease Brother      Cancer Sister      Diabetes Sister      Alzheimer's disease Mother      Stroke Father 76     Social History     Social History     Marital status:      Spouse name: Cecilia     Number of children: N/A     Years of education: N/A     Occupational History     Not on file.     Social History Main Topics     Smoking status: Former Smoker     Smokeless tobacco: Never Used     Alcohol use No      Comment: Hx alcohol abuse- sober x 3 yrs     Drug use: No     Sexual activity: Not on file     Other Topics Concern     Not on file     Social History Narrative     Current Outpatient Prescriptions   Medication Sig Dispense Refill     acetaminophen (TYLENOL) 500 MG tablet Take 2 tablets (1,000 mg total) by mouth 3 (three) times a day.  0     allopurinol (ZYLOPRIM) 300 MG tablet Take 300 mg by mouth daily.        ascorbic acid, vitamin C, (VITAMIN C) 500 MG tablet Take 500 mg by mouth daily.       aspirin 325 MG  tablet Take 1 tablet (325 mg total) by mouth 2 (two) times a day.  0     furosemide (LASIX) 20 MG tablet Take 20 mg by mouth daily.       Lactobacillus rhamnosus GG (CULTURELLE) 10-15 Billion cell capsule Take 1 capsule by mouth daily.       levoFLOXacin (LEVAQUIN) 500 MG tablet Take 500 mg by mouth daily.       lisinopril (PRINIVIL,ZESTRIL) 10 MG tablet Take 2 tablets (20 mg total) by mouth daily. 60 tablet 0     magnesium oxide 500 mg Tab Take 500 mg by mouth 2 (two) times a day.       multivitamin therapeutic (THERAGRAN) tablet Take 1 tablet by mouth daily.       oxyCODONE (ROXICODONE) 5 MG immediate release tablet Take 1-2 tablets (5-10 mg total) by mouth every 4 (four) hours as needed for pain. 40 tablet 0     SALMON OIL/OMEGA-3 FATTY ACIDS (SALMON OIL-1000 ORAL) Take 1 capsule by mouth daily.        senna-docusate (PERICOLACE) 8.6-50 mg tablet Take 1 tablet by mouth 2 (two) times a day.  0     sotalol (BETAPACE) 120 MG tablet Take 1 tablet (120 mg total) by mouth 2 (two) times a day. 180 tablet 5     tamsulosin (FLOMAX) 0.4 mg cap Take 1 capsule (0.4 mg total) by mouth daily after supper. 30 capsule 0     No current facility-administered medications for this visit.      Allergies   Allergen Reactions     Lipitor [Atorvastatin] Other (See Comments)     Had GI ulcer and could have been from Lipitor vs NSAIDs     Nsaids (Non-Steroidal Anti-Inflammatory Drug) Other (See Comments)     ULCER HX       Review of Systems   No fevers or chills. No headache, lightheadedness or dizziness. Chronic SOB with activity,  no chest pains or palpitations. Appetite is good. No nausea, vomiting, constipation or diarrhea. No dysuria, frequency, burning or pain with urination.  Pain well-controlled oxycodone.  Chronic lymphedema.  Edema to upper extremity. Recent fall without injury. He is having increased bleeding from the distal part of his incision to right arm.  Otherwise review of systems are negative      Physical Exam    PHYSICAL EXAMINATION:  Vital signs: BP 94/58, heart rate 64, respirations 16, temperature 98.5, O2 sat 97% on room air.  Current weight 303 pounds.    General: Awake, Alert, oriented x3, appropriately, follows simple commands, conversant  HEENT:PERRLA, Pink conjunctiva, anicteric sclerae, moist oral mucosa. [place] with aide.   NECK: Supple, without masses  BACK: No kyphosis of the thoracic spine  ABDOMEN: Soft, obese, nontender to palpation, with positive bowel sounds  EXTREMITIES:  Moves both upper and lower extremities with limitation on the right upper extremity, right wrist splint to right upper extremity as well as sling.  He continues with  moderate edema 2+ in the right hand. Edema sleeve on. Radial nerve palsy to right upper extremity.  Moderate lymphedema with lymphedema wraps on.  No tenderness with palpitation of the right groin.  SKIN: Incision to RUE now dry.  Ecchymosis resolved.  NEUROLOGIC: Intact, pulses faint secondary to edema.  PSYCHIATRIC: Cognition intact.  Pleasant affect.        Labs:  All labs reviewed in the nursing home record.   Results for orders placed or performed in visit on 09/25/18   Basic Metabolic Panel   Result Value Ref Range    Sodium 138 136 - 145 mmol/L    Potassium 4.2 3.5 - 5.0 mmol/L    Chloride 105 98 - 107 mmol/L    CO2 26 22 - 31 mmol/L    Anion Gap, Calculation 7 5 - 18 mmol/L    Glucose 167 (H) 70 - 125 mg/dL    Calcium 8.8 8.5 - 10.5 mg/dL    BUN 14 8 - 28 mg/dL    Creatinine 0.83 0.70 - 1.30 mg/dL    GFR MDRD Af Amer >60 >60 mL/min/1.73m2    GFR MDRD Non Af Amer >60 >60 mL/min/1.73m2     Lab Results   Component Value Date    WBC 5.4 09/25/2018    HGB 8.2 (L) 10/02/2018    HCT 24.4 (L) 09/25/2018     (H) 09/25/2018     (L) 09/25/2018     Lab Results   Component Value Date    INR 2.27 (H) 08/23/2018    INR 2.51 (H) 08/20/2018    INR 1.58 (H) 01/16/2018       Assessment and plan:  1. Humeral fracture     2. S/P ORIF (open reduction internal fixation)  fracture     3. Atrial fibrillation (H)     4. Lymphedema     5. Blood loss anemia     6. Diabetes mellitus (H)     7. Right groin pain       S/P  ORIF of the right upper extremity secondary to humeral fracture.  Recent x-ray showed separation of the plate and screws from the distal aspect of the proximal fracture site.  He is a follow-up with Orth O in the a.m.  There is some talk of possible surgery to repair his hardware.  Incision dry and intact.  Continues in splinting.  Radial nerve palsy and splinting.  Moderate hand edema.  Edema left in place. Pain well-controlled with oxycodone.  He had a recent wound infection culturing positive for staph.  He has completed his antibiotics.  Atrial fib.  Continues on aspirin and rate control drug.  Lymphedema.  He does continue with lower extremity lymphedema.  Kim has been consulted for compression wrap. Acute blood loss anemia. Hgb 8.2.  We will follow-up with hemoglobin next Tuesday.  Right groin pain.  I will order topical muscle rub.  Apply heat and ice.  Appears muscle skeletal      Electronically signed by: Nancy Ley, CNP

## 2021-06-20 NOTE — PROGRESS NOTES
Code Status:  FULL CODE  Visit Type: Problem Visit     Facility:  [facility] [[id number]]        Facility Type: SNF (Skilled Nursing Facility, TCU)    History of Present Illness: Jairon Gomez is a 72 y.o. male who I am seeing today for follow-up on the TCU.  Patient with history of hx of stable cirrhosis, DM2, HTN, morbid obesity,  chronic CHF, atrial fibrillation (chronic) w recent pacer, recently hospitalized on 8/20/18 secondary to fall sustaining a right comminuted Humeral fracturewith radial nerve palsy.  Ortho-Est consulted and nonsurgical management was initiated including humeral fracture brace and wrist support.  He also continues in a shoulder immobilizer.  There was initial concern for compartment syndrome given anticoagulation.  His hemoglobin dropped 2 g.  His Xarelto was stopped will be assessed at his next Orth of follow-up on 8/28.  At that time they will also attempt manual alignment.  If unsuccessful patient may have to undergo ORIF.  Initially patient had hypotension in lieu of chronic hypertension secondary to hypovolemia and acute blood loss anemia.  He did receive 1 unit of packed red blood cells.  His sotalol was held initially however resumed at discharge.  He did undergo urinalysis and renal ultrasound secondary to his AK I.  Lasix and lisinopril were initially held however resumed upon discharge.  He did initially have leukocytosis with a lactic acid of 3.5.  He was initially treated with Zosyn for 48 hours however this was stopped due to negative cultures.  He also had a negative chest x-ray.  Chronic diastolic congestive heart failure.  Continues with chronic lymphedema.  Last echo reviewed.  Normal ejection fraction of 65% with grade 2 diastolic dysfunction.  Atrial fib with tachybradycardia syndrome.  Does have pacemaker.  Is rate controlled with sotalol.  He is normally on chronic anticoagulation with Xarelto.  This is currently being held.  Hepatic encephalopathy  acute on chronic.  He does continue on lactulose.  He did undergo head CT which was negative.  Urinary retention.  He was started on Flomax.  Renal ultrasound negative for hydronephrosis.  Diabetes mellitus type 2.  Diet controlled.  Hemoglobin A1c 5.2.  Pain well-controlled with oxycodone.  Continues with moderate edema to the right hand.  Radial nerve palsy present.  Continues in splinting both upper and wrist as well as swelling.  Moderate lymphedema.      Today pt sitting up in bedside chair.  His wife is present on the visit.  Incision to right upper extremity dry and intact.  1 Steri-Strip remains.  No drainage.  Ecchymosis resolved.  No redness or warmth.  Pain well-controlled with oxycodone.  Continues with hand splint for radial nerve palsy.  2+ edema.  Edema glove on.  Patient down 6 pounds.  He continues in lymphedema wraps.  Will need custom compression.    Past Medical History:   Diagnosis Date     KHOI (acute kidney injury) (H)      Alcohol abuse, in remission      Anemia due to blood loss, acute      Ascites      Back pain     5 spurs in back     Chronic diastolic CHF (congestive heart failure) (H)      Diabetes (H)      Essential hypertension      Exertional dyspnea      Gastric ulcer 9/12/2015     GERD (gastroesophageal reflux disease)      Gout      HLD (hyperlipidemia)      Infectious viral hepatitis     ALCOHOLIC CIRRHOSIS     Lymphedema      Obesity      Osteoarthritis      Pacemaker      PAF (paroxysmal atrial fibrillation) (H)      Prostate cancer (H)      Psoriasis      Right humeral fracture      Skull fracture and subdural hematoma 2002    as a result of loss of [place] and [place]; negative workup during one week hospital stay with separate neuro and EP workup in [place] after return     Sleep apnea     no cpap recommended     SSS (sick sinus syndrome) (H)      Past Surgical History:   Procedure Laterality Date     BACK SURGERY  2011    for herniated disc, L3-L4      CARDIAC PACEMAKER PLACEMENT       CENTRAL VENOUS CATHETER INSERTION  08/24/2018    Non-tunneled - at [facility]     ESOPHAGOGASTRODUODENOSCOPY N/A 9/9/2015    Procedure: ESOPHAGOGASTRODUODENOSCOPY;  Surgeon: Karson Bauman MD;  Location: [facility] GI;  Service:      FOOT ARTHRODESIS Left 1/23/2018    Procedure: LEFT MIDFOOT FUSION WITH ALLOGRAFT;  Surgeon: Carey Ballesteros MD;  Location: [facility] Main OR;  Service:      [place] EDG US EXAM SURGICAL ALTER STOM DUODENUM/JEJUNUM N/A 11/16/2015    Procedure: ESOPHAGOGASTRODUODENOSCOPY/ENDOSCOPIC ULTRASOUND;  Surgeon: Juan Diego Gonzalez MD;  Location: [facility] GI;  Service: Gastroenterology     PROSTATECTOMY  1/26/2001    with bilateral lymph node dissection     TONSILLECTOMY       Family History   Problem Relation Age of Onset     Hepatitis Sister      Hepatitis C     Diabetes Brother      Heart disease Brother      Heart disease Brother      Heart disease Brother      Cancer Sister      Diabetes Sister      Alzheimer's disease Mother      Stroke Father 76     Social History     Social History     Marital status:      Spouse name: Cecilia     Number of children: N/A     Years of education: N/A     Occupational History     Not on file.     Social History Main Topics     Smoking status: Former Smoker     Smokeless tobacco: Never Used     Alcohol use No      Comment: Hx alcohol abuse- sober x 3 yrs     Drug use: No     Sexual activity: Not on file     Other Topics Concern     Not on file     Social History Narrative     Current Outpatient Prescriptions   Medication Sig Dispense Refill     acetaminophen (TYLENOL) 500 MG tablet Take 2 tablets (1,000 mg total) by mouth 3 (three) times a day.  0     allopurinol (ZYLOPRIM) 300 MG tablet Take 300 mg by mouth daily.        ascorbic acid, vitamin C, (VITAMIN C) 500 MG tablet Take 500 mg by mouth daily.       aspirin 325 MG tablet Take 1 tablet (325 mg total) by mouth 2 (two) times a day.  0     furosemide (LASIX) 20  MG tablet Take 20 mg by mouth daily.       Lactobacillus rhamnosus GG (CULTURELLE) 10-15 Billion cell capsule Take 1 capsule by mouth daily.       levoFLOXacin (LEVAQUIN) 500 MG tablet Take 500 mg by mouth daily.       lisinopril (PRINIVIL,ZESTRIL) 10 MG tablet Take 2 tablets (20 mg total) by mouth daily. 60 tablet 0     magnesium oxide 500 mg Tab Take 500 mg by mouth 2 (two) times a day.       multivitamin therapeutic (THERAGRAN) tablet Take 1 tablet by mouth daily.       oxyCODONE (ROXICODONE) 5 MG immediate release tablet Take 1-2 tablets (5-10 mg total) by mouth every 4 (four) hours as needed for pain. 40 tablet 0     SALMON OIL/OMEGA-3 FATTY ACIDS (SALMON OIL-1000 ORAL) Take 1 capsule by mouth daily.        senna-docusate (PERICOLACE) 8.6-50 mg tablet Take 1 tablet by mouth 2 (two) times a day.  0     sotalol (BETAPACE) 120 MG tablet Take 1 tablet (120 mg total) by mouth 2 (two) times a day. 180 tablet 5     tamsulosin (FLOMAX) 0.4 mg cap Take 1 capsule (0.4 mg total) by mouth daily after supper. 30 capsule 0     No current facility-administered medications for this visit.      Allergies   Allergen Reactions     Lipitor [Atorvastatin] Other (See Comments)     Had GI ulcer and could have been from Lipitor vs NSAIDs     Nsaids (Non-Steroidal Anti-Inflammatory Drug) Other (See Comments)     ULCER HX       Review of Systems   No fevers or chills. No headache, lightheadedness or dizziness. Chronic SOB with activity,  no chest pains or palpitations. Appetite is good. No nausea, vomiting, constipation or diarrhea. No dysuria, frequency, burning or pain with urination.  Pain well-controlled oxycodone.  Chronic lymphedema.  Edema to upper extremity. Recent fall without injury. He is having increased bleeding from the distal part of his incision to right arm.  Otherwise review of systems are negative      Physical Exam   PHYSICAL EXAMINATION:  Vital signs: /50, heart rate 69, respirations 20, temperature 98.5, O2  sat 96% on room air.  Current weight 301 pounds.    General: Awake, Alert, oriented x3, appropriately, follows simple commands, conversant  HEENT:PERRLA, Pink conjunctiva, anicteric sclerae, moist oral mucosa. [place] with aide.   NECK: Supple, without masses  BACK: No kyphosis of the thoracic spine  ABDOMEN: Soft, obese, nontender to palpation, with positive bowel sounds  EXTREMITIES:  Moves both upper and lower extremities with limitation on the right upper extremity, right wrist splint to right upper extremity as well as sling.  He continues with  moderate edema 2+ in the right hand. Edema sleeve on. Radial nerve palsy to right upper extremity.  SKIN: Incision to RUE now dry.  Ecchymosis resolving.  NEUROLOGIC: Intact, pulses faint secondary to edema.  PSYCHIATRIC: Cognition intact.  Pleasant affect.        Labs:  All labs reviewed in the nursing home record.   Results for orders placed or performed in visit on 09/25/18   Basic Metabolic Panel   Result Value Ref Range    Sodium 138 136 - 145 mmol/L    Potassium 4.2 3.5 - 5.0 mmol/L    Chloride 105 98 - 107 mmol/L    CO2 26 22 - 31 mmol/L    Anion Gap, Calculation 7 5 - 18 mmol/L    Glucose 167 (H) 70 - 125 mg/dL    Calcium 8.8 8.5 - 10.5 mg/dL    BUN 14 8 - 28 mg/dL    Creatinine 0.83 0.70 - 1.30 mg/dL    GFR MDRD Af Amer >60 >60 mL/min/1.73m2    GFR MDRD Non Af Amer >60 >60 mL/min/1.73m2     Lab Results   Component Value Date    WBC 5.4 09/25/2018    HGB 8.2 (L) 10/02/2018    HCT 24.4 (L) 09/25/2018     (H) 09/25/2018     (L) 09/25/2018     Lab Results   Component Value Date    INR 2.27 (H) 08/23/2018    INR 2.51 (H) 08/20/2018    INR 1.58 (H) 01/16/2018       Assessment and plan:  1. Humeral fracture     2. S/P ORIF (open reduction internal fixation) fracture     3. Wound infection     4. Lymphedema     5. Atrial fibrillation (H)     6. Blood loss anemia       S/P  ORIF of the right upper extremity secondary to humeral fracture.  Recent x-ray showed  separation of the plate and screws from the distal aspect of the proximal fracture site.  Is a follow-up with Orth on Friday.  Incision dry and intact.  No drainage.  Ecchymosis resolving.  Pain well-controlled with oxycodone.  He had a recent wound infection culturing positive for staph.  He has completed his antibiotics.  Atrial fib.  Continues on aspirin and rate control drug.  Lymphedema.  He does continue with lower extremity lymphedema.  Kim has been consulted for compression wrap.  Continues with lymphedema therapy here at the facility.  Acute blood loss anemia.  Today's hemoglobin 8.2.  He does continue on iron therapy.  We will follow-up next with hemoglobin.      Electronically signed by: Nancy Ley, GLADYS

## 2021-06-20 NOTE — ANESTHESIA PREPROCEDURE EVALUATION
Anesthesia Evaluation      Patient summary reviewed   No history of anesthetic complications     Airway   Mallampati: III  Neck ROM: full   Pulmonary - normal exam   (+) shortness of breath, sleep apnea on no CPAP, ,                          Cardiovascular   Exercise tolerance: > or = 4 METS  (+) pacemaker, hypertension, dysrhythmias, CHF, , hypercholesterolemia,     (-) angina  ECG reviewed  Rhythm: irregular  Rate: normal,         Neuro/Psych    (+) chronic pain    Endo/Other    (+) diabetes mellitus type 2, arthritis, obesity (morbid),      GI/Hepatic/Renal    (+) GERD well controlled,   chronic renal disease (KHOI), impaired hepatic function (alcohol abuse)          Dental    (+) upper dentures and lower dentures                       Anesthesia Plan  Planned anesthetic: general endotracheal and peripheral nerve block  50 mg ketamine IV on induction.  Supraclavicular block for post op pain control.  ASA 3   Induction: intravenous   Anesthetic plan and risks discussed with: patient and spouse    Post-op plan: routine recovery

## 2021-06-20 NOTE — PROGRESS NOTES
Code Status:  FULL CODE  Visit Type: Problem Visit     Facility:  [facility] [[id number]]        Facility Type: SNF (Skilled Nursing Facility, TCU)    History of Present Illness: Jairon Gomez is a 72 y.o. male who I am seeing today for follow-up on the TCU.  Patient with history of hx of stable cirrhosis, DM2, HTN, morbid obesity,  chronic CHF, atrial fibrillation (chronic) w recent pacer, recently hospitalized on 8/20/18 secondary to fall sustaining a right comminuted Humeral fracturewith radial nerve palsy.  Ortho-Est consulted and nonsurgical management was initiated including humeral fracture brace and wrist support.  He also continues in a shoulder immobilizer.  There was initial concern for compartment syndrome given anticoagulation.  His hemoglobin dropped 2 g.  His Xarelto was stopped will be assessed at his next Orth of follow-up on 8/28.  At that time they will also attempt manual alignment.  If unsuccessful patient may have to undergo ORIF.  Initially patient had hypotension in lieu of chronic hypertension secondary to hypovolemia and acute blood loss anemia.  He did receive 1 unit of packed red blood cells.  His sotalol was held initially however resumed at discharge.  He did undergo urinalysis and renal ultrasound secondary to his AK I.  Lasix and lisinopril were initially held however resumed upon discharge.  He did initially have leukocytosis with a lactic acid of 3.5.  He was initially treated with Zosyn for 48 hours however this was stopped due to negative cultures.  He also had a negative chest x-ray.  Chronic diastolic congestive heart failure.  Continues with chronic lymphedema.  Last echo reviewed.  Normal ejection fraction of 65% with grade 2 diastolic dysfunction.  Atrial fib with tachybradycardia syndrome.  Does have pacemaker.  Is rate controlled with sotalol.  He is normally on chronic anticoagulation with Xarelto.  This is currently being held.  Hepatic encephalopathy  acute on chronic.  He does continue on lactulose.  He did undergo head CT which was negative.  Urinary retention.  He was started on Flomax.  Renal ultrasound negative for hydronephrosis.  Diabetes mellitus type 2.  Diet controlled.  Hemoglobin A1c 5.2.  Pain well-controlled with oxycodone.  Continues with moderate edema to the right hand.  Radial nerve palsy present.  Continues in splinting both upper and wrist as well as swelling.  Moderate lymphedema.      Today patient sitting up in bedside chair.  He continues in splinting to his right upper extremity.  Moderate edema.  He has edema sleeve on.  Pain well-controlled with oxycodone.  Continues with lymphedema therapy.  Moderate edema to lower extremities.  Denies any shortness of breath or chest pain.  Bowels are moving regularly.        Past Medical History:   Diagnosis Date     KHOI (acute kidney injury) (H)      Alcohol abuse, in remission      Anemia due to blood loss, acute      Ascites      Back pain     5 spurs in back     Chronic diastolic CHF (congestive heart failure) (H)      Diabetes (H)      Essential hypertension      Exertional dyspnea      Gastric ulcer 9/12/2015     GERD (gastroesophageal reflux disease)      Gout      HLD (hyperlipidemia)      Infectious viral hepatitis     ALCOHOLIC CIRRHOSIS     Lymphedema      Obesity      Osteoarthritis      Pacemaker      PAF (paroxysmal atrial fibrillation) (H)      Prostate cancer (H)      Psoriasis      Right humeral fracture      Skull fracture and subdural hematoma 2002    as a result of loss of consciousness and [place]; negative workup during one week hospital stay with separate neuro and EP workup in [place] after return     Sleep apnea     no cpap recommended     SSS (sick sinus syndrome) (H)      Past Surgical History:   Procedure Laterality Date     BACK SURGERY  2011    for herniated disc, L3-L4     CARDIAC PACEMAKER PLACEMENT       CENTRAL VENOUS CATHETER INSERTION   08/24/2018    Non-tunneled - at [facility]     ESOPHAGOGASTRODUODENOSCOPY N/A 9/9/2015    Procedure: ESOPHAGOGASTRODUODENOSCOPY;  Surgeon: Karson Bauman MD;  Location: [facility] GI;  Service:      FOOT ARTHRODESIS Left 1/23/2018    Procedure: LEFT MIDFOOT FUSION WITH ALLOGRAFT;  Surgeon: Carey Ballesteros MD;  Location: [facility] OR;  Service:      PA EDG US EXAM SURGICAL ALTER STOM DUODENUM/JEJUNUM N/A 11/16/2015    Procedure: ESOPHAGOGASTRODUODENOSCOPY/ENDOSCOPIC ULTRASOUND;  Surgeon: Juan Diego Gonzalez MD;  Location: [facility] GI;  Service: Gastroenterology     PROSTATECTOMY  1/26/2001    with bilateral lymph node dissection     TONSILLECTOMY       Family History   Problem Relation Age of Onset     Hepatitis Sister      Hepatitis C     Diabetes Brother      Heart disease Brother      Heart disease Brother      Heart disease Brother      Cancer Sister      Diabetes Sister      Alzheimer's disease Mother      Stroke Father 76     Social History     Social History     Marital status:      Spouse name: Cecilia     Number of children: N/A     Years of education: N/A     Occupational History     Not on file.     Social History Main Topics     Smoking status: Former Smoker     Smokeless tobacco: Never Used     Alcohol use No      Comment: Hx alcohol abuse- sober x 3 yrs     Drug use: No     Sexual activity: Not on file     Other Topics Concern     Not on file     Social History Narrative     Current Outpatient Prescriptions   Medication Sig Dispense Refill     acetaminophen (TYLENOL) 500 MG tablet Take 2 tablets (1,000 mg total) by mouth 3 (three) times a day.  0     allopurinol (ZYLOPRIM) 300 MG tablet Take 300 mg by mouth daily.        ascorbic acid, vitamin C, (VITAMIN C) 500 MG tablet Take 500 mg by mouth daily.       aspirin 325 MG tablet Take 1 tablet (325 mg total) by mouth 2 (two) times a day.  0     furosemide (LASIX) 20 MG tablet Take 20 mg by mouth daily.       Lactobacillus rhamnosus GG  (CULTURELLE) 10-15 Billion cell capsule Take 1 capsule by mouth daily.       lisinopril (PRINIVIL,ZESTRIL) 10 MG tablet Take 2 tablets (20 mg total) by mouth daily. 60 tablet 0     magnesium oxide 500 mg Tab Take 500 mg by mouth 2 (two) times a day.       multivitamin therapeutic (THERAGRAN) tablet Take 1 tablet by mouth daily.       oxyCODONE (ROXICODONE) 5 MG immediate release tablet Take 1-2 tablets (5-10 mg total) by mouth every 4 (four) hours as needed for pain. 40 tablet 0     SALMON OIL/OMEGA-3 FATTY ACIDS (SALMON OIL-1000 ORAL) Take 1 capsule by mouth daily.        senna-docusate (PERICOLACE) 8.6-50 mg tablet Take 1 tablet by mouth 2 (two) times a day.  0     sotalol (BETAPACE) 120 MG tablet Take 1 tablet (120 mg total) by mouth 2 (two) times a day. 180 tablet 5     tamsulosin (FLOMAX) 0.4 mg cap Take 1 capsule (0.4 mg total) by mouth daily after supper. 30 capsule 0     No current facility-administered medications for this visit.      Allergies   Allergen Reactions     Lipitor [Atorvastatin] Other (See Comments)     Had GI ulcer and could have been from Lipitor vs NSAIDs     Nsaids (Non-Steroidal Anti-Inflammatory Drug) Other (See Comments)     ULCER HX       Review of Systems   No fevers or chills. No headache, lightheadedness or dizziness. Chronic SOB with activity,  no chest pains or palpitations. Appetite is good. No nausea, vomiting, constipation or diarrhea. No dysuria, frequency, burning or pain with urination.  Pain well-controlled oxycodone.  Chronic lymphedema.  Edema to upper extremity.  Otherwise review of systems are negative      Physical Exam   PHYSICAL EXAMINATION:  Vital signs: /72, heart rate 61, respirations 18, temperature 98.2, O2 sat 96% on room air.  Current weight 312.4 pounds.  General: Awake, Alert, oriented x3, appropriately, follows simple commands, conversant  HEENT:PERRLA, Pink conjunctiva, anicteric sclerae, moist oral mucosa. [place] with aide.   NECK: Supple, without  masses  CVS:  S1  S2, without murmur or gallop.  Pacemaker.  LUNG: Clear to auscultation, No wheezes, rales or rhonci.  No shortness of breath at rest.  BACK: No kyphosis of the thoracic spine  ABDOMEN: Soft, obese, nontender to palpation, with positive bowel sounds  EXTREMITIES:  Moves both upper and lower extremities with limitation on the right upper extremity, right wrist splint to right upper extremity as well as sling.  He continues with  moderate edema 3+ in the right hand. Edema sleeve on.  Chronic lymphedema with moderate lymphedema to bilateral lower extremities. RLE  edema slightly improved.  Radial nerve palsy to right upper extremity.  SKIN: Warm and dry.    NEUROLOGIC: Intact, pulses faint secondary to edema.  PSYCHIATRIC: Cognition intact.  Flat affect.         Labs:  All labs reviewed in the nursing home record.   Results for orders placed or performed in visit on 09/17/18   Basic Metabolic Panel   Result Value Ref Range    Sodium 139 136 - 145 mmol/L    Potassium 4.3 3.5 - 5.0 mmol/L    Chloride 105 98 - 107 mmol/L    CO2 26 22 - 31 mmol/L    Anion Gap, Calculation 8 5 - 18 mmol/L    Glucose 95 70 - 125 mg/dL    Calcium 8.8 8.5 - 10.5 mg/dL    BUN 19 8 - 28 mg/dL    Creatinine 0.83 0.70 - 1.30 mg/dL    GFR MDRD Af Amer >60 >60 mL/min/1.73m2    GFR MDRD Non Af Amer >60 >60 mL/min/1.73m2     Lab Results   Component Value Date    WBC 7.1 09/17/2018    HGB 8.1 (L) 09/19/2018    HCT 24.7 (L) 09/17/2018     (H) 09/17/2018     09/17/2018     Lab Results   Component Value Date    INR 2.27 (H) 08/23/2018    INR 2.51 (H) 08/20/2018    INR 1.58 (H) 01/16/2018       Assessment and plan:  1. Humeral fracture     2. S/P ORIF (open reduction internal fixation) fracture     3. Lymphedema     4. Blood loss anemia     5. Atrial fibrillation (H)     6. CHF (congestive heart failure) (H)       Patient status post ORIF of the right upper extremity secondary to humeral fracture.  Patient continues in  immobilizer.  Pain well-controlled with oxycodone.  Moderate edema to the right hand.  Continues an edema sleeve.  Elevation encouraged.  Lymphedema to lower extremity secondary to chronic lymphedema.  Chronic atrial fibrillation chronic anticoagulation.  His anticoagulation has been resumed.  CHF.  Compensated.    Electronically signed by: Nancy Ley CNP

## 2021-06-20 NOTE — ANESTHESIA PROCEDURE NOTES
Peripheral Block    Patient location during procedure: pre-op  Start time: 9/11/2018 1:38 PM  End time: 9/11/2018 1:44 PM  post-op analgesia per surgeon order as noted in medical record  Staffing:  Performing  Anesthesiologist: TAYLOR BRADLEY  Preanesthetic Checklist  Completed: patient identified, site marked, risks, benefits, and alternatives discussed, timeout performed, consent obtained, at patient's request, airway assessed, oxygen available, suction available, emergency drugs available and hand hygiene performed  Peripheral Block  Block type: brachial plexus, supraclavicular  Prep: ChloraPrep  Patient position: supine  Patient monitoring: blood pressure, heart rate, continuous pulse oximetry and cardiac monitor  Laterality: right  Injection technique: ultrasound guided    Ultrasound used to visualize needle placement in proximity to nerve being blocked: yes   Permanent ultrasound image captured for medical record  Sterile gel and probe cover used for ultrasound.    Needle  Needle type: Stimuplex   Needle gauge: 21 G  Needle length: 4 in  no peripheral nerve catheter placed  Assessment  Injection assessment: no difficulty with injection, negative aspiration for heme, no paresthesia on injection and incremental injection

## 2021-06-20 NOTE — ANESTHESIA POSTPROCEDURE EVALUATION
Patient: Jairon Gomez    OPEN REDUCTION INTERNAL FIXATION PROXIMAL HUMERUS FRACTURE, RIGHT  Anesthesia type: general    Patient location: PACU  Last vitals:   Vitals:    09/11/18 2040   BP: 120/59   Pulse: 66   Resp: 20   Temp:    SpO2: 96%     Post vital signs: stable  Level of consciousness: awake and responds to simple questions  Post-anesthesia pain: pain controlled  Post-anesthesia nausea and vomiting: no  Pulmonary: unassisted, return to baseline  Cardiovascular: stable and blood pressure at baseline  Hydration: adequate  Anesthetic events: no    QCDR Measures:  ASA# 11 - Romi-op Cardiac Arrest: ASA11B - Patient did NOT experience unanticipated cardiac arrest  ASA# 12 - Romi-op Mortality Rate: ASA12B - Patient did NOT die  ASA# 13 - PACU Re-Intubation Rate: ASA13B - Patient did NOT require a new airway mgmt  ASA# 10 - Composite Anes Safety: ASA10A - No serious adverse event    Additional Notes: Did very well in the PACU. VSS. Hgb=10.4. Had ST segment changes early in stay that normalized quickly. Will send to tele and have hospitalist follow. Have put in consult, and paged 2x without return.

## 2021-06-20 NOTE — PROGRESS NOTES
Code Status:  FULL CODE  Visit Type: Problem Visit     Facility:  [facility] [[id number]]        Facility Type: SNF (Skilled Nursing Facility, TCU)    History of Present Illness: Jairon Gomez is a 72 y.o. male who I am seeing today for preop for surgical repair of humeral fracture.  Patient with history of hx of stable cirrhosis, DM2, HTN, morbid obesity,  chronic CHF, atrial fibrillation (chronic) w recent pacer, recently hospitalized on 8/20/18 secondary to fall sustaining a right comminuted Humeral fracturewith radial nerve palsy.  Ortho-Est consulted and nonsurgical management was initiated including humeral fracture brace and wrist support.  He also continues in a shoulder immobilizer.  There was initial concern for compartment syndrome given anticoagulation.  His hemoglobin dropped 2 g.  His Xarelto was stopped will be assessed at his next Orth of follow-up on 8/28.  At that time they will also attempt manual alignment.  If unsuccessful patient may have to undergo ORIF.  Initially patient had hypotension in lieu of chronic hypertension secondary to hypovolemia and acute blood loss anemia.  He did receive 1 unit of packed red blood cells.  His sotalol was held initially however resumed at discharge.  He did undergo urinalysis and renal ultrasound secondary to his AK I.  Lasix and lisinopril were initially held however resumed upon discharge.  He did initially have leukocytosis with a lactic acid of 3.5.  He was initially treated with Zosyn for 48 hours however this was stopped due to negative cultures.  He also had a negative chest x-ray.  Chronic diastolic congestive heart failure.  Continues with chronic lymphedema.  Last echo reviewed.  Normal ejection fraction of 65% with grade 2 diastolic dysfunction.  Atrial fib with tachybradycardia syndrome.  Does have pacemaker.  Is rate controlled with sotalol.  He is normally on chronic anticoagulation with Xarelto.  This is currently being held.   Hepatic encephalopathy acute on chronic.  He does continue on lactulose.  He did undergo head CT which was negative.  Urinary retention.  He was started on Flomax.  Renal ultrasound negative for hydronephrosis.  Diabetes mellitus type 2.  Diet controlled.  Hemoglobin A1c 5.2.  Pain well-controlled with oxycodone.  Continues with moderate edema to the right hand.  Radial nerve palsy present.  Continues in splinting both upper and wrist as well as swelling.  Moderate lymphedema.        Past Medical History:   Diagnosis Date     Alcohol abuse, in remission      Anemia due to blood loss, acute      Ascites      Back pain     5 spurs in back     Chronic diastolic CHF (congestive heart failure) (H)      Diabetes (H)      Essential hypertension      Exertional dyspnea      Foot fracture, left      Gastric ulcer 9/12/2015     GERD (gastroesophageal reflux disease)      Gout      HLD (hyperlipidemia)      Infectious viral hepatitis     ALCOHOLIC CIRRHOSIS     Obesity      Osteoarthritis      Pacemaker      PAF (paroxysmal atrial fibrillation) (H)      Prostate cancer (H)      Psoriasis      Skull fracture and subdural hematoma 2002    as a result of loss of consciousness and [place]; negative workup during one week hospital stay with separate neuro and EP workup in [place] after return     Sleep apnea      SSS (sick sinus syndrome) (H)      Past Surgical History:   Procedure Laterality Date     BACK SURGERY  2011    for herniated disc, L3-L4     CARDIAC PACEMAKER PLACEMENT       ESOPHAGOGASTRODUODENOSCOPY N/A 9/9/2015    Procedure: ESOPHAGOGASTRODUODENOSCOPY;  Surgeon: Karson Bauman MD;  Location: [facility] GI;  Service:      FOOT ARTHRODESIS Left 1/23/2018    Procedure: LEFT MIDFOOT FUSION WITH ALLOGRAFT;  Surgeon: Carey Ballesteros MD;  Location: [facility] OR;  Service:      SD EDG US EXAM SURGICAL ALTER STOM DUODENUM/JEJUNUM N/A 11/16/2015    Procedure:  ESOPHAGOGASTRODUODENOSCOPY/ENDOSCOPIC ULTRASOUND;  Surgeon: Juan Diego Gonzalez MD;  Location: [facility];  Service: Gastroenterology     PROSTATECTOMY  1/26/2001    with bilateral lymph node dissection     TONSILLECTOMY       Family History   Problem Relation Age of Onset     Hepatitis Sister      Hepatitis C     Diabetes Brother      Heart disease Brother      Heart disease Brother      Heart disease Brother      Cancer Sister      Diabetes Sister      Alzheimer's disease Mother      Stroke Father 76     Social History     Social History     Marital status:      Spouse name: Cecilia     Number of children: N/A     Years of education: N/A     Occupational History     Not on file.     Social History Main Topics     Smoking status: Former Smoker     Smokeless tobacco: Never Used     Alcohol use No     Drug use: No     Sexual activity: Not on file     Other Topics Concern     Not on file     Social History Narrative     Current Outpatient Prescriptions   Medication Sig Dispense Refill     acetaminophen (TYLENOL) 500 MG tablet Take 1 tablet (500 mg total) by mouth every 6 (six) hours as needed for pain or fever. 30 tablet 0     allopurinol (ZYLOPRIM) 300 MG tablet Take 300 mg by mouth daily.        ascorbic acid, vitamin C, (VITAMIN C) 500 MG tablet Take 500 mg by mouth daily.       aspirin 81 mg chewable tablet Chew 1 tablet (81 mg total) daily. 30 tablet 0     furosemide (LASIX) 20 MG tablet Take 20 mg by mouth daily.       Lactobacillus rhamnosus GG (CULTURELLE) 10-15 Billion cell capsule Take 1 capsule by mouth daily.       Lactobacillus rhamnosus GG (CULTURELLE) 15 billion cell CpSP Take 1 capsule by mouth 3 (three) times a day with meals.  0     lactulose (ENULOSE) 20 gram/30 mL Soln solution Take 5 mL by mouth daily. Taking 1 teaspoonful daily       lisinopril (PRINIVIL,ZESTRIL) 10 MG tablet Take 2 tablets (20 mg total) by mouth daily. 60 tablet 0     magnesium oxide (MAG-OX) 400 mg tablet Take 500 mg by  mouth 2 (two) times a day.        multivitamin therapeutic (THERAGRAN) tablet Take 1 tablet by mouth daily.       oxyCODONE (ROXICODONE) 5 MG immediate release tablet Take 1-2 tablets (5-10 mg total) by mouth every 4 (four) hours as needed. 40 tablet 0     SALMON OIL/OMEGA-3 FATTY ACIDS (SALMON OIL-1000 ORAL) Take 1 capsule by mouth daily.        sotalol (BETAPACE) 120 MG tablet Take 1 tablet (120 mg total) by mouth 2 (two) times a day. 180 tablet 5     tamsulosin (FLOMAX) 0.4 mg cap Take 1 capsule (0.4 mg total) by mouth daily after supper. 30 capsule 0     No current facility-administered medications for this visit.      Allergies   Allergen Reactions     Lipitor [Atorvastatin] Other (See Comments)     Had GI ulcer and could have been from Lipitor vs NSAIDs     Nsaids (Non-Steroidal Anti-Inflammatory Drug) Other (See Comments)     ULCER HX       Review of Systems   No fevers or chills. No headache, lightheadedness or dizziness. Chronic SOB with activity,  no chest pains or palpitations. Appetite is good. No nausea, vomiting, constipation or diarrhea. No dysuria, frequency, burning or pain with urination.  Pain well-controlled oxycodone.  Chronic lymphedema.  Edema to upper extremity.  Otherwise review of systems are negative      Physical Exam   PHYSICAL EXAMINATION:  Vital signs: /55, heart rate 60, respirations 16, temperature 96.7, O2 sat 97% on room air.  Current weight 312.4 pounds.  General: Awake, Alert, oriented x3, appropriately, follows simple commands, conversant  HEENT:PERRLA, Pink conjunctiva, anicteric sclerae, moist oral mucosa. [place] with aide.   NECK: Supple, without masses  CVS:  S1  S2, without murmur or gallop.  Pacemaker.  LUNG: Clear to auscultation, No wheezes, rales or rhonci.  No shortness of breath at rest.  BACK: No kyphosis of the thoracic spine  ABDOMEN: Soft, obese, nontender to palpation, with positive bowel sounds  EXTREMITIES:  Moves both upper and lower extremities with  limitation on the right upper extremity, right wrist splint to right upper extremity as well as sling.  He continues with  moderate edema 2-3+ in the right hand.  Splint in place to wrist secondary to radial nerve palsy on the RUE.    Edema sleeve on.  Chronic lymphedema with moderate lymphedema to bilateral lower extremities. RLE  edema slightly improved.  SKIN: Warm and dry.  Blisters intact to right upper extremity.  NEUROLOGIC: Intact, pulses faint secondary to edema.  PSYCHIATRIC: Cognition intact          Labs:  All labs reviewed in the nursing home record.   Results for orders placed or performed in visit on 09/04/18   Basic Metabolic Panel   Result Value Ref Range    Sodium 140 136 - 145 mmol/L    Potassium 4.1 3.5 - 5.0 mmol/L    Chloride 104 98 - 107 mmol/L    CO2 28 22 - 31 mmol/L    Anion Gap, Calculation 8 5 - 18 mmol/L    Glucose 87 70 - 125 mg/dL    Calcium 8.8 8.5 - 10.5 mg/dL    BUN 15 8 - 28 mg/dL    Creatinine 0.73 0.70 - 1.30 mg/dL    GFR MDRD Af Amer >60 >60 mL/min/1.73m2    GFR MDRD Non Af Amer >60 >60 mL/min/1.73m2     Lab Results   Component Value Date    WBC 6.6 09/04/2018    HGB 10.3 (L) 09/04/2018    HCT 31.0 (L) 09/04/2018     (H) 09/04/2018     (L) 09/04/2018     Lab Results   Component Value Date    INR 2.27 (H) 08/23/2018    INR 2.51 (H) 08/20/2018    INR 1.58 (H) 01/16/2018     F/U labs pending for in am.     Imaging:  Chest X-ray on 8/23/2018 and EKG 8/25/2018 reviewed.    No history Of reaction to anesthia and/or bleeding.     Assessment:  1. Humeral fracture     2. Atrial fibrillation (H)     3. CHF (congestive heart failure) (H)     4. Blood loss anemia     5. Diabetes mellitus (H)     6. Lymphedema     7. Acute kidney injury (H)     8. Hypertension           Plan: Patient with recent humeral head fracture secondary to fall.  He does continue in upper arm splinting including swelling and wrist splint for radial nerve palsy.  Moderate edema in the right hand.  Edema  sleeve in place.  Pain well-controlled with oxycodone and Tylenol.  Acute blood loss anemia.  Hemoglobin 10.6.  Diabetes mellitus type 2.  Diet controlled.  Blood sugars have been stable.  Chronic lymphedema.  He has been receiving lymphedema therapy.  Continues with lymphedema wraps.  Continues on Lasix 20 mg daily.  CHF.  Occasional shortness of breath with exertion.  Denies any chest pain.  Continues on lisinopril 20 mg daily and Lasix 20 mg daily.  A. fib.  Rate control.  Continues on sotalol and aspirin.  Will hold his aspirin until surgery.  May resume afterwards.  He does have a pacemaker in place.  Given his high risk cardiology did clear him for surgery on 8/21/2015.    Patient will be n.p.o. at midnight prior to morning of surgery.  He can take his regular meds with sips of water.    Total time spent for this visit was 45 minutes with greater than 50% of time in face-to-face with patient and wife discussing the above plan of care.    Electronically signed by: Nancy Ley CNP

## 2021-06-20 NOTE — PROGRESS NOTES
Code Status:  FULL CODE  Visit Type: Problem Visit     Facility:  [facility] [[id number]]        Facility Type: SNF (Skilled Nursing Facility, TCU)    History of Present Illness: Jairon Gomez is a 72 y.o. male who I am seeing today for follow-up on the TCU.  Patient with history of hx of stable cirrhosis, DM2, HTN, morbid obesity,  chronic CHF, atrial fibrillation (chronic) w recent pacer, recently hospitalized on 8/20/18 secondary to fall sustaining a right comminuted Humeral fracturewith radial nerve palsy.  Ortho-Est consulted and nonsurgical management was initiated including humeral fracture brace and wrist support.  He also continues in a shoulder immobilizer.  There was initial concern for compartment syndrome given anticoagulation.  His hemoglobin dropped 2 g.  His Xarelto was stopped will be assessed at his next Orth of follow-up on 8/28.  At that time they will also attempt manual alignment.  If unsuccessful patient may have to undergo ORIF.  Initially patient had hypotension in lieu of chronic hypertension secondary to hypovolemia and acute blood loss anemia.  He did receive 1 unit of packed red blood cells.  His sotalol was held initially however resumed at discharge.  He did undergo urinalysis and renal ultrasound secondary to his AK I.  Lasix and lisinopril were initially held however resumed upon discharge.  He did initially have leukocytosis with a lactic acid of 3.5.  He was initially treated with Zosyn for 48 hours however this was stopped due to negative cultures.  He also had a negative chest x-ray.  Chronic diastolic congestive heart failure.  Continues with chronic lymphedema.  Last echo reviewed.  Normal ejection fraction of 65% with grade 2 diastolic dysfunction.  Atrial fib with tachybradycardia syndrome.  Does have pacemaker.  Is rate controlled with sotalol.  He is normally on chronic anticoagulation with Xarelto.  This is currently being held.  Hepatic encephalopathy  acute on chronic.  He does continue on lactulose.  He did undergo head CT which was negative.  Urinary retention.  He was started on Flomax.  Renal ultrasound negative for hydronephrosis.  Diabetes mellitus type 2.  Diet controlled.  Hemoglobin A1c 5.2.  Pain well-controlled with oxycodone.  Continues with moderate edema to the right hand.  Radial nerve palsy present.  Continues in splinting both upper and wrist as well as swelling.  Moderate lymphedema.      Today pt sitting up in bedside chair. He continues with moderate sanguineous drainage to distal aspect of his incision. Xray obtained yesterday showed suggestion of separation of the plate and screws from the distal aspect of the proximal fracture site. I can not feel hardware at the area however there does appear to be hematoma underneath incision line. His ASA has been on hold. He denies any increased pain. He does have moderate edema and bruising.       Past Medical History:   Diagnosis Date     KHOI (acute kidney injury) (H)      Alcohol abuse, in remission      Anemia due to blood loss, acute      Ascites      Back pain     5 spurs in back     Chronic diastolic CHF (congestive heart failure) (H)      Diabetes (H)      Essential hypertension      Exertional dyspnea      Gastric ulcer 9/12/2015     GERD (gastroesophageal reflux disease)      Gout      HLD (hyperlipidemia)      Infectious viral hepatitis     ALCOHOLIC CIRRHOSIS     Lymphedema      Obesity      Osteoarthritis      Pacemaker      PAF (paroxysmal atrial fibrillation) (H)      Prostate cancer (H)      Psoriasis      Right humeral fracture      Skull fracture and subdural hematoma 2002    as a result of loss of consciousness and [place]; negative workup during one week hospital stay with separate neuro and EP workup in [facility] after return     Sleep apnea     no cpap recommended     SSS (sick sinus syndrome) (H)      Past Surgical History:   Procedure Laterality Date     BACK  SURGERY  2011    for herniated disc, L3-L4     CARDIAC PACEMAKER PLACEMENT       CENTRAL VENOUS CATHETER INSERTION  08/24/2018    Non-tunneled - at [facility]     ESOPHAGOGASTRODUODENOSCOPY N/A 9/9/2015    Procedure: ESOPHAGOGASTRODUODENOSCOPY;  Surgeon: Karson Bauman MD;  Location: [facility] GI;  Service:      FOOT ARTHRODESIS Left 1/23/2018    Procedure: LEFT MIDFOOT FUSION WITH ALLOGRAFT;  Surgeon: Carey Ballesteros MD;  Location: [facility] OR;  Service:      [place] EDG US EXAM SURGICAL ALTER STOM DUODENUM/JEJUNUM N/A 11/16/2015    Procedure: ESOPHAGOGASTRODUODENOSCOPY/ENDOSCOPIC ULTRASOUND;  Surgeon: Juan Diego Gonzalez MD;  Location: [facility] GI;  Service: Gastroenterology     PROSTATECTOMY  1/26/2001    with bilateral lymph node dissection     TONSILLECTOMY       Family History   Problem Relation Age of Onset     Hepatitis Sister      Hepatitis C     Diabetes Brother      Heart disease Brother      Heart disease Brother      Heart disease Brother      Cancer Sister      Diabetes Sister      Alzheimer's disease Mother      Stroke Father 76     Social History     Social History     Marital status:      Spouse name: Cecilia     Number of children: N/A     Years of education: N/A     Occupational History     Not on file.     Social History Main Topics     Smoking status: Former Smoker     Smokeless tobacco: Never Used     Alcohol use No      Comment: Hx alcohol abuse- sober x 3 yrs     Drug use: No     Sexual activity: Not on file     Other Topics Concern     Not on file     Social History Narrative     Current Outpatient Prescriptions   Medication Sig Dispense Refill     acetaminophen (TYLENOL) 500 MG tablet Take 2 tablets (1,000 mg total) by mouth 3 (three) times a day.  0     allopurinol (ZYLOPRIM) 300 MG tablet Take 300 mg by mouth daily.        ascorbic acid, vitamin C, (VITAMIN C) 500 MG tablet Take 500 mg by mouth daily.       aspirin 325 MG tablet Take 1 tablet (325 mg total) by mouth 2  (two) times a day.  0     furosemide (LASIX) 20 MG tablet Take 20 mg by mouth daily.       Lactobacillus rhamnosus GG (CULTURELLE) 10-15 Billion cell capsule Take 1 capsule by mouth daily.       levoFLOXacin (LEVAQUIN) 500 MG tablet Take 500 mg by mouth daily.       lisinopril (PRINIVIL,ZESTRIL) 10 MG tablet Take 2 tablets (20 mg total) by mouth daily. 60 tablet 0     magnesium oxide 500 mg Tab Take 500 mg by mouth 2 (two) times a day.       multivitamin therapeutic (THERAGRAN) tablet Take 1 tablet by mouth daily.       oxyCODONE (ROXICODONE) 5 MG immediate release tablet Take 1-2 tablets (5-10 mg total) by mouth every 4 (four) hours as needed for pain. 40 tablet 0     SALMON OIL/OMEGA-3 FATTY ACIDS (SALMON OIL-1000 ORAL) Take 1 capsule by mouth daily.        senna-docusate (PERICOLACE) 8.6-50 mg tablet Take 1 tablet by mouth 2 (two) times a day.  0     sotalol (BETAPACE) 120 MG tablet Take 1 tablet (120 mg total) by mouth 2 (two) times a day. 180 tablet 5     tamsulosin (FLOMAX) 0.4 mg cap Take 1 capsule (0.4 mg total) by mouth daily after supper. 30 capsule 0     No current facility-administered medications for this visit.      Allergies   Allergen Reactions     Lipitor [Atorvastatin] Other (See Comments)     Had GI ulcer and could have been from Lipitor vs NSAIDs     Nsaids (Non-Steroidal Anti-Inflammatory Drug) Other (See Comments)     ULCER HX       Review of Systems   No fevers or chills. No headache, lightheadedness or dizziness. Chronic SOB with activity,  no chest pains or palpitations. Appetite is good. No nausea, vomiting, constipation or diarrhea. No dysuria, frequency, burning or pain with urination.  Pain well-controlled oxycodone.  Chronic lymphedema.  Edema to upper extremity. Recent fall without injury. He is having increased bleeding from the distal part of his incision to right arm.  Otherwise review of systems are negative      Physical Exam   PHYSICAL EXAMINATION:  Vital signs: /66, heart  rate 66, respirations 16, temperature 97.6, O2 sat 95% on room air.  Current weight 311.6 pounds.  General: Awake, Alert, oriented x3, appropriately, follows simple commands, conversant  HEENT:PERRLA, Pink conjunctiva, anicteric sclerae, moist oral mucosa. [place] with aide.   NECK: Supple, without masses  BACK: No kyphosis of the thoracic spine  ABDOMEN: Soft, obese, nontender to palpation, with positive bowel sounds  EXTREMITIES:  Moves both upper and lower extremities with limitation on the right upper extremity, right wrist splint to right upper extremity as well as sling.  He continues with  moderate edema 3+ in the right hand. Edema sleeve on. Radial nerve palsy to right upper extremity.  SKIN: Incision to RUE draining a moderate amount of sanguinous drainage from the distal aspect. Arm very ecchymotic, slightly warm to touch with moderate edema. Feels as if hematoma is under incision line. No hardware palpable.   NEUROLOGIC: Intact, pulses faint secondary to edema.  PSYCHIATRIC: Cognition intact.  Flat affect.         Labs:  All labs reviewed in the nursing home record.   Results for orders placed or performed in visit on 09/25/18   Basic Metabolic Panel   Result Value Ref Range    Sodium 138 136 - 145 mmol/L    Potassium 4.2 3.5 - 5.0 mmol/L    Chloride 105 98 - 107 mmol/L    CO2 26 22 - 31 mmol/L    Anion Gap, Calculation 7 5 - 18 mmol/L    Glucose 167 (H) 70 - 125 mg/dL    Calcium 8.8 8.5 - 10.5 mg/dL    BUN 14 8 - 28 mg/dL    Creatinine 0.83 0.70 - 1.30 mg/dL    GFR MDRD Af Amer >60 >60 mL/min/1.73m2    GFR MDRD Non Af Amer >60 >60 mL/min/1.73m2     Lab Results   Component Value Date    WBC 5.4 09/25/2018    HGB 7.7 (L) 09/25/2018    HCT 24.4 (L) 09/25/2018     (H) 09/25/2018     (L) 09/25/2018     Lab Results   Component Value Date    INR 2.27 (H) 08/23/2018    INR 2.51 (H) 08/20/2018    INR 1.58 (H) 01/16/2018       Assessment and plan:  1. Humeral fracture     2. S/P ORIF (open reduction  internal fixation) fracture     3. Wound infection     4. Blood loss anemia       S/P  ORIF of the right upper extremity secondary to humeral fracture.  Patient continues in immobilizer. He had a fall over the weekend. He denied any further injury however now with moderate amount of sanginous drainage from distal aspect of his RUE incision. Xray obtained which showed separation of the plate and screws from the distal aspect of the proximal fracture site. Hardware is non palpable on exam. Wound culture obtained positive for staph aureus and Enterobacter cloacae. He has been on Keflex. I will d/c this due to resistance. I will start Levaquin 500mg PO daily X 10 days. Ortho updated and he has a follow up in the am. I will continue to hold his ASA. Hemoglobin now 7.7. It was low prior to blood loss. I will start him on iron. Follow up hemoglobin on Thursday.   Continue with pressure dressing and icing.     Electronically signed by: Nancy Ley, GLADYS

## 2021-06-20 NOTE — PROGRESS NOTES
Code Status:  FULL CODE  Visit Type: Problem Visit     Facility:  [facility] [[id number]]         Facility Type: SNF (Skilled Nursing Facility, TCU)    History of Present Illness: Jairon Gomez is a 72 y.o. male with hx of   stable cirrhosis, DM2, HTN, morbid obesity, chronic CHF, atrial fibrillation (chronic) w recent pacer, recently hospitalized on 8/20/18 secondary to fall sustaining a right comminuted Humeral fracturewith radial nerve palsy.  Ortho-Est consulted and nonsurgical management was initiated including humeral fracture brace and wrist support.  He also continues in a shoulder immobilizer.  There was initial concern for compartment syndrome given anticoagulation.  His hemoglobin dropped 2 g.  His Xarelto was stopped will be assessed at his next Orth of follow-up on 8/28.  At that time they will also attempt manual alignment.  If unsuccessful patient may have to undergo ORIF.  Initially patient had hypotension in lieu of chronic hypertension secondary to hypovolemia and acute blood loss anemia.  He did receive 1 unit of packed red blood cells.  His sotalol was held initially however resumed at discharge.  He did undergo urinalysis and renal ultrasound secondary to his AK I.  Lasix and lisinopril were initially held however resumed upon discharge.  He did initially have leukocytosis with a lactic acid of 3.5.  He was initially treated with Zosyn for 48 hours however this was stopped due to negative cultures.  He also had a negative chest x-ray.  Chronic diastolic congestive heart failure.  Continues with chronic lymphedema.  Last echo reviewed.  Normal ejection fraction of 65% with grade 2 diastolic dysfunction.  Atrial fib with tachybradycardia syndrome.  Does have pacemaker.  Is rate controlled with sotalol.  He is normally on chronic anticoagulation with Xarelto.  This is currently being held.  Hepatic encephalopathy acute on chronic.  He does continue on lactulose.  He did undergo  head CT which was negative.  Urinary retention.  He was started on Flomax.  Renal ultrasound negative for hydronephrosis.  Diabetes mellitus type 2.  Diet controlled.  Hemoglobin A1c 5.2.      Today patient sitting up in wheelchair.  Patient underwent venous Doppler of the right upper extremity on 9 1/18 which was negative.  Patient continues with moderate lymphedema to lower extremities.  He continues in the continues with moderate edema to the right hand. Hand is independent position on visit today.  I did progress on pillows.  Continues in splinting upper extremity as well as swelling and wrist support.  Pain well-controlled with oxycodone.  Patient was seen by Joy O today.  He is going to undergo  surgical repair on 9/11/18.  Patient continues with moderate lymphedema in the lower extremities.  Continues in lymphedema therapy.      Active Ambulatory Problems     Diagnosis Date Noted     Type 2 diabetes mellitus without complication, without long-term current use of insulin (H)      Hypomagnesemia 09/12/2015     Gout 03/01/2016     Obese 07/17/2016     Alcoholic cirrhosis of liver without ascites (H)      Fall at home 05/10/2017     Sinus bradycardia 05/10/2017     Essential hypertension      Atrial fibrillation with RVR (H)      Syncope and collapse      Sinus pause      SSS (sick sinus syndrome) (H)      Cardiac pacemaker in situ 05/22/2017     Fracture, humerus closed 08/20/2018     Chronic diastolic CHF (congestive heart failure) (H)      Exertional dyspnea      Tachy-joe syndrome (H)      Paroxysmal atrial fibrillation (H)      Preoperative evaluation of a medical condition to rule out surgical contraindications (TAR required)      Anemia due to blood loss, acute      Acute retention of urine 08/22/2018     Resolved Ambulatory Problems     Diagnosis Date Noted     Hepatic encephalopathy (H) 09/09/2015     Hematemesis 09/09/2015     KHOI (acute kidney injury) (H) 09/09/2015     Gastric ulcer 09/12/2015      Gastric mass 09/12/2015     Knee effusion, right 11/18/2015     Cellulitis of left lower extremity 07/17/2016     Cellulitis of leg 07/17/2016     Peripheral edema 07/17/2016     Cellulitis 07/18/2016     Benign essential HTN 07/18/2016     Elevated LFTs      Past Medical History:   Diagnosis Date     Alcohol abuse, in remission      Anemia due to blood loss, acute      Ascites      Back pain      Chronic diastolic CHF (congestive heart failure) (H)      Diabetes (H)      Essential hypertension      Exertional dyspnea      Foot fracture, left      Gastric ulcer 9/12/2015     GERD (gastroesophageal reflux disease)      Gout      HLD (hyperlipidemia)      Infectious viral hepatitis      Obesity      Osteoarthritis      Pacemaker      PAF (paroxysmal atrial fibrillation) (H)      Prostate cancer (H)      Psoriasis      Skull fracture and subdural hematoma 2002     Sleep apnea      SSS (sick sinus syndrome) (H)        Current Outpatient Prescriptions   Medication Sig     acetaminophen (TYLENOL) 500 MG tablet Take 1 tablet (500 mg total) by mouth every 6 (six) hours as needed for pain or fever.     allopurinol (ZYLOPRIM) 300 MG tablet Take 300 mg by mouth daily.      ascorbic acid, vitamin C, (VITAMIN C) 500 MG tablet Take 500 mg by mouth daily.     aspirin 81 mg chewable tablet Chew 1 tablet (81 mg total) daily.     furosemide (LASIX) 20 MG tablet Take 20 mg by mouth daily.     Lactobacillus rhamnosus GG (CULTURELLE) 10-15 Billion cell capsule Take 1 capsule by mouth daily.     Lactobacillus rhamnosus GG (CULTURELLE) 15 billion cell CpSP Take 1 capsule by mouth 3 (three) times a day with meals.     lactulose (ENULOSE) 20 gram/30 mL Soln solution Take 5 mL by mouth daily. Taking 1 teaspoonful daily     lisinopril (PRINIVIL,ZESTRIL) 10 MG tablet Take 2 tablets (20 mg total) by mouth daily.     magnesium oxide (MAG-OX) 400 mg tablet Take 500 mg by mouth 2 (two) times a day.      multivitamin therapeutic (THERAGRAN) tablet  Take 1 tablet by mouth daily.     oxyCODONE (ROXICODONE) 5 MG immediate release tablet Take 1-2 tablets (5-10 mg total) by mouth every 4 (four) hours as needed.     SALMON OIL/OMEGA-3 FATTY ACIDS (SALMON OIL-1000 ORAL) Take 1 capsule by mouth daily.      sotalol (BETAPACE) 120 MG tablet Take 1 tablet (120 mg total) by mouth 2 (two) times a day.     tamsulosin (FLOMAX) 0.4 mg cap Take 1 capsule (0.4 mg total) by mouth daily after supper.       Allergies   Allergen Reactions     Lipitor [Atorvastatin] Other (See Comments)     Had GI ulcer and could have been from Lipitor vs NSAIDs     Nsaids (Non-Steroidal Anti-Inflammatory Drug) Other (See Comments)     ULCER HX         Review of Systems   No fevers or chills. No headache, lightheadedness or dizziness. Chronic SOB with activity,  no chest pains or palpitations. Appetite is good. No nausea, vomiting, constipation or diarrhea. No dysuria, frequency, burning or pain with urination.  Pain well-controlled oxycodone.  Chronic lymphedema.  Edema to upper extremity.  Otherwise review of systems are negative.         Physical Exam   PHYSICAL EXAMINATION:  Vital signs: Current weight 314.8 pounds.  Blood pressure 143/76, O2 sat 98% on room air, pulse 60, temperature 97.4.  General: Awake, Alert, oriented x3, appropriately, follows simple commands, conversant  HEENT:PERRLA, Pink conjunctiva, anicteric sclerae, moist oral mucosa. [place] with aide.   NECK: Supple, without masses  CVS:  S1  S2, without murmur or gallop.   LUNG: Clear to auscultation, No wheezes, rales or rhonci.  No shortness of breath at rest.  BACK: No kyphosis of the thoracic spine  ABDOMEN: Soft, obese, nontender to palpation, with positive bowel sounds  EXTREMITIES:  Moves both upper and lower extremities with limitation on the right upper extremity, right wrist splint to right upper extremity as well as sling.  He continues with  moderate edema 2-3+ in the right hand.  Splint in place to wrist secondary to  radial nerve palsy on the RUE.  Chronic lymphedema with moderate lymphedema to bilateral lower extremities. RLE  edema slightly improved.  SKIN: Warm and dry.  Blisters intact to right upper extremity.  NEUROLOGIC: Intact, pulses faint secondary to edema.  PSYCHIATRIC: Cognition intact            Labs:    Recent Results (from the past 240 hour(s))   Crossmatch   Result Value Ref Range    Crossmatch Compatible     Blood Expiration Date [id number]     Unit Type O Pos     Unit Number [id number]     Status Transfused     Component Red Blood Cells     PRODUCT CODE F4064A51     Issue Date and Time [id number]     Blood Type 5100     CODING SYSTEM [id number]    Renal Function Profile   Result Value Ref Range    Albumin 2.5 (L) 3.5 - 5.0 g/dL    Calcium 8.6 8.5 - 10.5 mg/dL    Phosphorus 2.9 2.5 - 4.5 mg/dL    Glucose 114 70 - 125 mg/dL    BUN 26 8 - 28 mg/dL    Creatinine 0.82 0.70 - 1.30 mg/dL    Sodium 135 (L) 136 - 145 mmol/L    Potassium 4.7 3.5 - 5.0 mmol/L    Chloride 102 98 - 107 mmol/L    CO2 25 22 - 31 mmol/L    Anion Gap, Calculation 8 5 - 18 mmol/L    GFR MDRD Af Amer >60 >60 mL/min/1.73m2    GFR MDRD Non Af Amer >60 >60 mL/min/1.73m2   HM2(CBC w/o Differential)   Result Value Ref Range    WBC 9.9 4.0 - 11.0 thou/uL    RBC 2.80 (L) 4.40 - 6.20 mill/uL    Hemoglobin 9.6 (L) 14.0 - 18.0 g/dL    Hematocrit 28.7 (L) 40.0 - 54.0 %     (H) 80 - 100 fL    MCH 34.3 (H) 27.0 - 34.0 pg    MCHC 33.4 32.0 - 36.0 g/dL    RDW 15.2 (H) 11.0 - 14.5 %    Platelets 120 (L) 140 - 440 thou/uL    MPV 10.0 8.5 - 12.5 fL   POCT Glucose   Result Value Ref Range    Glucose,  mg/dL   ECG 12 lead with MUSE   Result Value Ref Range    SYSTOLIC BLOOD PRESSURE  mmHg    DIASTOLIC BLOOD PRESSURE  mmHg    VENTRICULAR RATE 67 BPM    ATRIAL RATE 67 BPM    P-R INTERVAL 184 ms    QRS DURATION 92 ms    Q-T INTERVAL 422 ms    QTC CALCULATION (BEZET) 445 ms    P Axis 42 degrees    R AXIS 27 degrees    T AXIS 21 degrees    MUSE  DIAGNOSIS       Normal sinus rhythm  Normal ECG  When compared with ECG of 24-AUG-2018 16:20,  No significant change was found  Confirmed by YOMI DUGGAN MD LOC: ([id number]) on 8/26/2018 12:31:08 PM     POCT Glucose   Result Value Ref Range    Glucose,  mg/dL   Basic Metabolic Panel   Result Value Ref Range    Sodium 135 (L) 136 - 145 mmol/L    Potassium 4.5 3.5 - 5.0 mmol/L    Chloride 99 98 - 107 mmol/L    CO2 31 22 - 31 mmol/L    Anion Gap, Calculation 5 5 - 18 mmol/L    Glucose 84 70 - 125 mg/dL    Calcium 9.1 8.5 - 10.5 mg/dL    BUN 19 8 - 28 mg/dL    Creatinine 0.89 0.70 - 1.30 mg/dL    GFR MDRD Af Amer >60 >60 mL/min/1.73m2    GFR MDRD Non Af Amer >60 >60 mL/min/1.73m2   Hemoglobin   Result Value Ref Range    Hemoglobin 10.7 (L) 14.0 - 18.0 g/dL   Basic Metabolic Panel   Result Value Ref Range    Sodium 137 136 - 145 mmol/L    Potassium 4.4 3.5 - 5.0 mmol/L    Chloride 101 98 - 107 mmol/L    CO2 30 22 - 31 mmol/L    Anion Gap, Calculation 6 5 - 18 mmol/L    Glucose 82 70 - 125 mg/dL    Calcium 9.4 8.5 - 10.5 mg/dL    BUN 17 8 - 28 mg/dL    Creatinine 0.81 0.70 - 1.30 mg/dL    GFR MDRD Af Amer >60 >60 mL/min/1.73m2    GFR MDRD Non Af Amer >60 >60 mL/min/1.73m2   HM2(CBC w/o Differential)   Result Value Ref Range    WBC 8.8 4.0 - 11.0 thou/uL    RBC 3.09 (L) 4.40 - 6.20 mill/uL    Hemoglobin 10.9 (L) 14.0 - 18.0 g/dL    Hematocrit 32.6 (L) 40.0 - 54.0 %     (H) 80 - 100 fL    MCH 35.3 (H) 27.0 - 34.0 pg    MCHC 33.4 32.0 - 36.0 g/dL    RDW 15.3 (H) 11.0 - 14.5 %    Platelets 127 (L) 140 - 440 thou/uL    MPV 10.2 8.5 - 12.5 fL   Basic Metabolic Panel   Result Value Ref Range    Sodium 140 136 - 145 mmol/L    Potassium 4.1 3.5 - 5.0 mmol/L    Chloride 104 98 - 107 mmol/L    CO2 28 22 - 31 mmol/L    Anion Gap, Calculation 8 5 - 18 mmol/L    Glucose 87 70 - 125 mg/dL    Calcium 8.8 8.5 - 10.5 mg/dL    BUN 15 8 - 28 mg/dL    Creatinine 0.73 0.70 - 1.30 mg/dL    GFR MDRD Af Amer >60 >60 mL/min/1.73m2     GFR MDRD Non Af Amer >60 >60 mL/min/1.73m2   HM1 (CBC with Diff)   Result Value Ref Range    WBC 6.6 4.0 - 11.0 thou/uL    RBC 2.96 (L) 4.40 - 6.20 mill/uL    Hemoglobin 10.3 (L) 14.0 - 18.0 g/dL    Hematocrit 31.0 (L) 40.0 - 54.0 %     (H) 80 - 100 fL    MCH 34.8 (H) 27.0 - 34.0 pg    MCHC 33.2 32.0 - 36.0 g/dL    RDW 15.5 (H) 11.0 - 14.5 %    Platelets 111 (L) 140 - 440 thou/uL    MPV 10.6 8.5 - 12.5 fL    Neutrophils % 50 50 - 70 %    Lymphocytes % 29 20 - 40 %    Monocytes % 16 (H) 2 - 10 %    Eosinophils % 4 0 - 6 %    Basophils % 1 0 - 2 %    Neutrophils Absolute 3.3 2.0 - 7.7 thou/uL    Lymphocytes Absolute 1.9 0.8 - 4.4 thou/uL    Monocytes Absolute 1.1 (H) 0.0 - 0.9 thou/uL    Eosinophils Absolute 0.3 0.0 - 0.4 thou/uL    Basophils Absolute 0.0 0.0 - 0.2 thou/uL         Assessment/Plan:  1. Humeral fracture     2. Lymphedema     3. Atrial fibrillation (H)     4. CHF (congestive heart failure) (H)     5. Diabetes mellitus (H)       Patient with recent fall sustaining right humeral head fracture.  He was seen by Ortho today.  He is to undergo surgical intervention on 911/2018.  He will need a preop which I will do later in the week.  He continues with moderate edema to that right upper extremity.  We are encouraging elevation.  Continues in sling and splinting.  Radial nerve palsy present.  Pain well-controlled with oxycodone.  Atrial fib.  He does continue on aspirin 81 mg daily.  We will need to hold this prior to his surgery.  CHF.  Continues on Lasix.  He is down weight.  Diabetes.  Satisfactory controlled. Acute blood loss anemia.  Hemoglobin 10.7.      Electronically signed by: Nancy Ley, CNP

## 2021-06-20 NOTE — PROGRESS NOTES
Code Status:  FULL CODE  Visit Type: Problem Visit     Facility:  [facility] [[id number]]         Facility Type: SNF (Skilled Nursing Facility, TCU)    History of Present Illness: Jairon Gomez is a 72 y.o. male with hx of   stable cirrhosis, DM2, HTN, morbid obesity, chronic CHF, atrial fibrillation (chronic) w recent pacer, recently hospitalized on 8/20/18 secondary to fall sustaining a right comminuted Humeral fracturewith radial nerve palsy.  Ortho-Est consulted and nonsurgical management was initiated including humeral fracture brace and wrist support.  He also continues in a shoulder immobilizer.  There was initial concern for compartment syndrome given anticoagulation.  His hemoglobin dropped 2 g.  His Xarelto was stopped will be assessed at his next Orth of follow-up on 8/28.  At that time they will also attempt manual alignment.  If unsuccessful patient may have to undergo ORIF.  Initially patient had hypotension in lieu of chronic hypertension secondary to hypovolemia and acute blood loss anemia.  He did receive 1 unit of packed red blood cells.  His sotalol was held initially however resumed at discharge.  He did undergo urinalysis and renal ultrasound secondary to his AK I.  Lasix and lisinopril were initially held however resumed upon discharge.  He did initially have leukocytosis with a lactic acid of 3.5.  He was initially treated with Zosyn for 48 hours however this was stopped due to negative cultures.  He also had a negative chest x-ray.  Chronic diastolic congestive heart failure.  Continues with chronic lymphedema.  Last echo reviewed.  Normal ejection fraction of 65% with grade 2 diastolic dysfunction.  Atrial fib with tachybradycardia syndrome.  Does have pacemaker.  Is rate controlled with sotalol.  He is normally on chronic anticoagulation with Xarelto.  This is currently being held.  Hepatic encephalopathy acute on chronic.  He does continue on lactulose.  He did undergo  head CT which was negative.  Urinary retention.  He was started on Flomax.  Renal ultrasound negative for hydronephrosis.  Diabetes mellitus type 2.  Diet controlled.  Hemoglobin A1c 5.2.      Today patient sitting up in bedside chair.  OT is in the room.  He has underwent 1 episode of lymphedema therapy.  Continues in lymphedema wraps.  Moderate lymphedema to lower extremities.  He also has moderate edema to his right hand.  Hard brace to the upper part of the right upper extremity with right upper extremity immobilizer as well as right wrist splint.  Moderate radial nerve palsy.  Dependent position is contributing to increased edema of the hand.  Therapy is evaluating him for edema sleeve.   He does have some intact blisters.  Pain well-controlled with oxycodone.  He tells me he is voiding adequately.  He denies any constipation.      Active Ambulatory Problems     Diagnosis Date Noted     Type 2 diabetes mellitus without complication, without long-term current use of insulin (H)      Hypomagnesemia 09/12/2015     Gout 03/01/2016     Obese 07/17/2016     Alcoholic cirrhosis of liver without ascites (H)      Fall at home 05/10/2017     Sinus bradycardia 05/10/2017     Essential hypertension      Atrial fibrillation with RVR (H)      Syncope and collapse      Sinus pause      SSS (sick sinus syndrome) (H)      Cardiac pacemaker in situ 05/22/2017     Fracture, humerus closed 08/20/2018     Chronic diastolic CHF (congestive heart failure) (H)      Exertional dyspnea      Tachy-joe syndrome (H)      Paroxysmal atrial fibrillation (H)      Preoperative evaluation of a medical condition to rule out surgical contraindications (TAR required)      Anemia due to blood loss, acute      Acute retention of urine 08/22/2018     Resolved Ambulatory Problems     Diagnosis Date Noted     Hepatic encephalopathy (H) 09/09/2015     Hematemesis 09/09/2015     KHOI (acute kidney injury) (H) 09/09/2015     Gastric ulcer 09/12/2015      Gastric mass 09/12/2015     Knee effusion, right 11/18/2015     Cellulitis of left lower extremity 07/17/2016     Cellulitis of leg 07/17/2016     Peripheral edema 07/17/2016     Cellulitis 07/18/2016     Benign essential HTN 07/18/2016     Elevated LFTs      Past Medical History:   Diagnosis Date     Alcohol abuse, in remission      Anemia due to blood loss, acute      Ascites      Back pain      Chronic diastolic CHF (congestive heart failure) (H)      Diabetes (H)      Essential hypertension      Exertional dyspnea      Foot fracture, left      Gastric ulcer 9/12/2015     GERD (gastroesophageal reflux disease)      Gout      HLD (hyperlipidemia)      Infectious viral hepatitis      Obesity      Osteoarthritis      Pacemaker      PAF (paroxysmal atrial fibrillation) (H)      Prostate cancer (H)      Psoriasis      Skull fracture and subdural hematoma 2002     Sleep apnea      SSS (sick sinus syndrome) (H)        Current Outpatient Prescriptions   Medication Sig     acetaminophen (TYLENOL) 500 MG tablet Take 1 tablet (500 mg total) by mouth every 6 (six) hours as needed for pain or fever.     allopurinol (ZYLOPRIM) 300 MG tablet Take 300 mg by mouth daily.      ascorbic acid, vitamin C, (VITAMIN C) 500 MG tablet Take 500 mg by mouth daily.     aspirin 81 mg chewable tablet Chew 1 tablet (81 mg total) daily.     furosemide (LASIX) 20 MG tablet Take 20 mg by mouth daily.     Lactobacillus rhamnosus GG (CULTURELLE) 10-15 Billion cell capsule Take 1 capsule by mouth daily.     Lactobacillus rhamnosus GG (CULTURELLE) 15 billion cell CpSP Take 1 capsule by mouth 3 (three) times a day with meals.     lactulose (ENULOSE) 20 gram/30 mL Soln solution Take 5 mL by mouth daily. Taking 1 teaspoonful daily     lisinopril (PRINIVIL,ZESTRIL) 10 MG tablet Take 2 tablets (20 mg total) by mouth daily.     magnesium oxide (MAG-OX) 400 mg tablet Take 500 mg by mouth 2 (two) times a day.      multivitamin therapeutic (THERAGRAN) tablet  Take 1 tablet by mouth daily.     oxyCODONE (ROXICODONE) 5 MG immediate release tablet Take 1-2 tablets (5-10 mg total) by mouth every 4 (four) hours as needed.     SALMON OIL/OMEGA-3 FATTY ACIDS (SALMON OIL-1000 ORAL) Take 1 capsule by mouth daily.      sotalol (BETAPACE) 120 MG tablet Take 1 tablet (120 mg total) by mouth 2 (two) times a day.     tamsulosin (FLOMAX) 0.4 mg cap Take 1 capsule (0.4 mg total) by mouth daily after supper.       Allergies   Allergen Reactions     Lipitor [Atorvastatin] Other (See Comments)     Had GI ulcer and could have been from Lipitor vs NSAIDs     Nsaids (Non-Steroidal Anti-Inflammatory Drug) Other (See Comments)     ULCER HX         Review of Systems   No fevers or chills. No headache, lightheadedness or dizziness. Chronic SOB with activity,  no chest pains or palpitations. Appetite is good. No nausea, vomiting, constipation or diarrhea. No dysuria, frequency, burning or pain with urination.  Pain well-controlled oxycodone.  Chronic lymphedema.  Edema to upper extremity.  Otherwise review of systems are negative.         Physical Exam   PHYSICAL EXAMINATION:  Vital signs: /79, heart rate 66, respirations 20, temperature 97, O2 sat 97% on room air, current weight 318.8 pounds.  General: Awake, Alert, oriented x3, appropriately, follows simple commands, conversant  HEENT:PERRLA, Pink conjunctiva, anicteric sclerae, moist oral mucosa  NECK: Supple, without masses  CVS:  S1  S2, without murmur or gallop.   LUNG: Clear to auscultation, No wheezes, rales or rhonci.  BACK: No kyphosis of the thoracic spine  ABDOMEN: Soft, obese, nontender to palpation, with positive bowel sounds  EXTREMITIES:  Moves both upper and lower extremities with limitation on the right upper extremity, heart splint to right upper extremity as well as sliding.  He does have moderate edema 3-4+ in the right hand.  Splint in place to wrists secondary to radial nerve palsy.  Chronic lymphedema with moderate  lymphedema to bilateral lower extremities.  He does have lymphedema wraps in place.  SKIN: Warm and dry.  Blisters intact to right upper extremity.  NEUROLOGIC: Intact, pulses faint secondary to edema.  PSYCHIATRIC: Cognition intact            Labs:  All labs reviewed in the nursing home record.    Assessment/Plan:  1. Humeral fracture     2. Lymphedema     3. Atrial fibrillation (H)     4. Hypertension     5. CHF (congestive heart failure) (H)     6. Hepatic encephalopathy (H)     7. Diabetes mellitus (H)     8. Pain management       Patient with recent fall sustaining a right humeral head fracture with radial nerve palsy.  He continues in splinting and immobilization.  Moderate edema to the right hand.  He does have chronic lymphedema.  Therapy is working with him doing lymphedema therapy.  He is not a candidate for diathermy secondary to pacemaker placement.  They have fitted him for edema sleeve.  Will encourage elevation.  Atrial fib.  Rate controlled.  Was previously on Xarelto however this is being held until his follow-up with Orth on 8/28.  Acute blood loss anemia.  He did receive 1 unit of packed red blood cells during his hospitalization.  We will follow-up with a CBC and a BMP.  Hemoglobin currently 9.6.  Hypertension.  Satisfactory controlled.  CHF.  Does have chronic lower extremity lymphedema and chronic shortness of breath with exertion.  He does continue on Lasix.  Will monitor daily weights.  Lung sounds clear.  No shortness of breath at rest.  Urinary retention.  Recent addition of Flomax.  Voiding without difficulty.  Hepatic encephalopathy.  He continues on lactulose.  Alert and oriented on today's visit answers all questions appropriately.  Patient continues with a great deal of pain.  Is on oxycodone every 4 hours as needed as well as Tylenol.  Patient had a follow-up with Orth on 8/28.  They will attempt manual reduction if unsuccessful will plan for ORIF.        45  minutes spent of which  greater than 50% was face to face communication with the patient about above plan of care    Electronically signed by: Nancy Ley CNP

## 2021-06-20 NOTE — PROGRESS NOTES
Code Status:  FULL CODE  Visit Type: Problem Visit     Facility:  [facility] [[id number]]        Facility Type: SNF (Skilled Nursing Facility, TCU)    History of Present Illness: Jairon Gomez is a 72 y.o. male who I am seeing today for follow-up on the TCU.  Patient with history of hx of stable cirrhosis, DM2, HTN, morbid obesity,  chronic CHF, atrial fibrillation (chronic) w recent pacer, recently hospitalized on 8/20/18 secondary to fall sustaining a right comminuted Humeral fracturewith radial nerve palsy.  Ortho-Est consulted and nonsurgical management was initiated including humeral fracture brace and wrist support.  He also continues in a shoulder immobilizer.  There was initial concern for compartment syndrome given anticoagulation.  His hemoglobin dropped 2 g.  His Xarelto was stopped will be assessed at his next Orth of follow-up on 8/28.  At that time they will also attempt manual alignment.  If unsuccessful patient may have to undergo ORIF.  Initially patient had hypotension in lieu of chronic hypertension secondary to hypovolemia and acute blood loss anemia.  He did receive 1 unit of packed red blood cells.  His sotalol was held initially however resumed at discharge.  He did undergo urinalysis and renal ultrasound secondary to his AK I.  Lasix and lisinopril were initially held however resumed upon discharge.  He did initially have leukocytosis with a lactic acid of 3.5.  He was initially treated with Zosyn for 48 hours however this was stopped due to negative cultures.  He also had a negative chest x-ray.  Chronic diastolic congestive heart failure.  Continues with chronic lymphedema.  Last echo reviewed.  Normal ejection fraction of 65% with grade 2 diastolic dysfunction.  Atrial fib with tachybradycardia syndrome.  Does have pacemaker.  Is rate controlled with sotalol.  He is normally on chronic anticoagulation with Xarelto.  This is currently being held.  Hepatic encephalopathy  acute on chronic.  He does continue on lactulose.  He did undergo head CT which was negative.  Urinary retention.  He was started on Flomax.  Renal ultrasound negative for hydronephrosis.  Diabetes mellitus type 2.  Diet controlled.  Hemoglobin A1c 5.2.  Pain well-controlled with oxycodone.  Continues with moderate edema to the right hand.  Radial nerve palsy present.  Continues in splinting both upper and wrist as well as swelling.  Moderate lymphedema.      Today patient sitting up in wheelchair.  His wife is present on exam.  Today he will undergo ORIF of the right humerus.  Continues with moderate edema in his upper extremity.  3-4+ in his hand.  Continues with radial nerve palsy.  Decreased range of motion at the wrist.  Chronic lymphedema.  He has been receiving lymphedema wraps in therapy.  Pain well-controlled with oxycodone.  Blood sugars satisfactory.  No urinary retention.  Having regular bowel movements.        Past Medical History:   Diagnosis Date     KHOI (acute kidney injury) (H)      Alcohol abuse, in remission      Anemia due to blood loss, acute      Ascites      Back pain     5 spurs in back     Chronic diastolic CHF (congestive heart failure) (H)      Diabetes (H)      Essential hypertension      Exertional dyspnea      Gastric ulcer 9/12/2015     GERD (gastroesophageal reflux disease)      Gout      HLD (hyperlipidemia)      Infectious viral hepatitis     ALCOHOLIC CIRRHOSIS     Lymphedema      Obesity      Osteoarthritis      Pacemaker      PAF (paroxysmal atrial fibrillation) (H)      Prostate cancer (H)      Psoriasis      Right humeral fracture      Skull fracture and subdural hematoma 2002    as a result of loss of consciousness and [place]; negative workup during one week hospital stay with separate neuro and EP workup in [place] after return     Sleep apnea     no cpap recommended     SSS (sick sinus syndrome) (H)      Past Surgical History:   Procedure Laterality  Date     BACK SURGERY  2011    for herniated disc, L3-L4     CARDIAC PACEMAKER PLACEMENT       CENTRAL VENOUS CATHETER INSERTION  08/24/2018    Non-tunneled - at [facility]     ESOPHAGOGASTRODUODENOSCOPY N/A 9/9/2015    Procedure: ESOPHAGOGASTRODUODENOSCOPY;  Surgeon: Karson Bauman MD;  Location: [facility] GI;  Service:      FOOT ARTHRODESIS Left 1/23/2018    Procedure: LEFT MIDFOOT FUSION WITH ALLOGRAFT;  Surgeon: Carey Ballesteros MD;  Location: [facility] OR;  Service:      [place] EDG US EXAM SURGICAL ALTER STOM DUODENUM/JEJUNUM N/A 11/16/2015    Procedure: ESOPHAGOGASTRODUODENOSCOPY/ENDOSCOPIC ULTRASOUND;  Surgeon: Juan Diego Gonzalez MD;  Location: [facility] GI;  Service: Gastroenterology     PROSTATECTOMY  1/26/2001    with bilateral lymph node dissection     TONSILLECTOMY       Family History   Problem Relation Age of Onset     Hepatitis Sister      Hepatitis C     Diabetes Brother      Heart disease Brother      Heart disease Brother      Heart disease Brother      Cancer Sister      Diabetes Sister      Alzheimer's disease Mother      Stroke Father 76     Social History     Social History     Marital status:      Spouse name: Cecilia     Number of children: N/A     Years of education: N/A     Occupational History     Not on file.     Social History Main Topics     Smoking status: Former Smoker     Smokeless tobacco: Never Used     Alcohol use No      Comment: Hx alcohol abuse- sober x 3 yrs     Drug use: No     Sexual activity: Not on file     Other Topics Concern     Not on file     Social History Narrative     No current facility-administered medications for this visit.      No current outpatient prescriptions on file.     Facility-Administered Medications Ordered in Other Visits   Medication Dose Route Frequency Provider Last Rate Last Dose     bupivacaine (PF) 0.5% 30 mL    Continuous PRN Gio Kwan MD   30 mL at 09/11/18 1344     ceFAZolin 3 g in 100 mL in D5W (ANCEF)  3 g  Intravenous 30 Min Pre-Op Esequiel Centeno PA-C         fentaNYL pf injection 50 mcg (SUBLIMAZE)  50 mcg Intravenous PRN Gio Kwan MD   50 mcg at 09/11/18 1338     lactated Ringers  100 mL/hr Intravenous Continuous PRN Gio Kwan  mL/hr at 09/11/18 1240 100 mL/hr at 09/11/18 1240     lidocaine 10 mg/mL (1 %) injection 0.1-0.3 mL  0.1-0.3 mL Subcutaneous PRN Esequiel Centeno PA-C         midazolam (PF) 1 mg/mL injection 1 mg (VERSED)  1 mg Intravenous PRN Gio Kwan MD   1 mg at 09/11/18 1338     NaCl 0.9% IR 0.9 % irrigation solution 1,000 mL (NS)  1,000 mL Irrigation Once Esequiel Centeno PA-C         naloxone injection 0.2-0.4 mg (NARCAN)  0.2-0.4 mg Intravenous PRN Gio Kwan MD        Or     naloxone injection 0.2-0.4 mg (NARCAN)  0.2-0.4 mg Intramuscular PRN Gio Kwan MD         naloxone injection 0.2-0.4 mg (NARCAN)  0.2-0.4 mg Intravenous PRN Gio Kwan MD        Or     naloxone injection 0.2-0.4 mg (NARCAN)  0.2-0.4 mg Intramuscular PRN Gio Kwan MD         sodium chloride flush 3 mL (NS)  3 mL Intravenous Line Care Esequiel Centeno PA-C         Allergies   Allergen Reactions     Lipitor [Atorvastatin] Other (See Comments)     Had GI ulcer and could have been from Lipitor vs NSAIDs     Nsaids (Non-Steroidal Anti-Inflammatory Drug) Other (See Comments)     ULCER HX       Review of Systems   No fevers or chills. No headache, lightheadedness or dizziness. Chronic SOB with activity,  no chest pains or palpitations. Appetite is good. No nausea, vomiting, constipation or diarrhea. No dysuria, frequency, burning or pain with urination.  Pain well-controlled oxycodone.  Chronic lymphedema.  Edema to upper extremity.  Otherwise review of systems are negative      Physical Exam   PHYSICAL EXAMINATION:  Vital signs: /84, heart rate 77, respirations 16, temperature 98.2, O2 sat 99% on room air.  Current weight 312.4 pounds.  General: Awake, Alert,  oriented x3, appropriately, follows simple commands, conversant  HEENT:PERRLA, Pink conjunctiva, anicteric sclerae, moist oral mucosa. [place] with aide.   NECK: Supple, without masses  CVS:  S1  S2, without murmur or gallop.  Pacemaker.  LUNG: Clear to auscultation, No wheezes, rales or rhonci.  No shortness of breath at rest.  BACK: No kyphosis of the thoracic spine  ABDOMEN: Soft, obese, nontender to palpation, with positive bowel sounds  EXTREMITIES:  Moves both upper and lower extremities with limitation on the right upper extremity, right wrist splint to right upper extremity as well as sling.  He continues with  moderate edema 3+ in the right hand.  Splint in place to wrist secondary to radial nerve palsy on the RUE.    Edema sleeve on.  Chronic lymphedema with moderate lymphedema to bilateral lower extremities. RLE  edema slightly improved.  SKIN: Warm and dry.    NEUROLOGIC: Intact, pulses faint secondary to edema.  PSYCHIATRIC: Cognition intact.  Flat affect.         Labs:  All labs reviewed in the nursing home record.   Results for orders placed or performed in visit on 09/07/18   Basic Metabolic Panel   Result Value Ref Range    Sodium 136 136 - 145 mmol/L    Potassium 4.1 3.5 - 5.0 mmol/L    Chloride 103 98 - 107 mmol/L    CO2 26 22 - 31 mmol/L    Anion Gap, Calculation 7 5 - 18 mmol/L    Glucose 166 (H) 70 - 125 mg/dL    Calcium 8.7 8.5 - 10.5 mg/dL    BUN 14 8 - 28 mg/dL    Creatinine 0.76 0.70 - 1.30 mg/dL    GFR MDRD Af Amer >60 >60 mL/min/1.73m2    GFR MDRD Non Af Amer >60 >60 mL/min/1.73m2     Lab Results   Component Value Date    WBC 6.1 09/07/2018    HGB 10.5 (L) 09/07/2018    HCT 33.1 (L) 09/07/2018     (H) 09/07/2018     (L) 09/07/2018     Lab Results   Component Value Date    INR 2.27 (H) 08/23/2018    INR 2.51 (H) 08/20/2018    INR 1.58 (H) 01/16/2018       Assessment:  1. Humeral fracture     2. Atrial fibrillation (H)     3. Lymphedema     4. Blood loss anemia     5. CHF  (congestive heart failure) (H)     6. Diabetes mellitus (H)     7. Acute kidney injury (H)       Plan:   Patient S/P humeral head fracture secondary to fall.  Today he will undergo ORIF.  Pain well-controlled oxycodone.  Moderate edema in the right hand.  Continues with right radial nerve palsy.  He does have splinting in place.  Acute blood loss anemia.  Hemoglobin 10.6.  Diabetes mellitus type 2.  Diet controlled.  Blood sugars have been stable.  Chronic lymphedema.  He has been receiving lymphedema therapy.  Continues with lymphedema wraps.  Continues on Lasix 20 mg daily.  CHF.  Occasional shortness of breath with exertion.  Denies any chest pain.  Continues on lisinopril 20 mg daily and Lasix 20 mg daily.  A. fib.  Rate control.  Continues on sotalol and aspirin.  Aspirin is currently being held.  May resume afterwards.  He does have a pacemaker in place. Patient currently n.p.o.    Electronically signed by: Nancy Ley, GLADYS

## 2021-06-21 NOTE — ANESTHESIA PREPROCEDURE EVALUATION
Anesthesia Evaluation      Patient summary reviewed   No history of anesthetic complications     Airway   Mallampati: III  Neck ROM: full   Pulmonary - normal exam    breath sounds clear to auscultation  (+) shortness of breath (with exertion), sleep apnea on no CPAP, ,   (-) not a smoker (Former)                         Cardiovascular   Exercise tolerance: > or = 4 METS  (+) pacemaker (Pacemaker for SSS), hypertension, dysrhythmias, CHF, , hypercholesterolemia,     (-) angina, murmur  ECG reviewed  Rhythm: irregular  Rate: normal,    no murmur   ROS comment: Both LE edematous x 2 months     Neuro/Psych    (+) chronic pain    Endo/Other    (+) diabetes mellitus type 2, arthritis, obesity (BMI),      Comments: Anemia,   Blood loss with previous surgery 9/2018 for humerus fracture    GI/Hepatic/Renal    (+) GERD,   chronic renal disease (KHOI), impaired hepatic function (alcohol abuse, EtOH cirrhosis)     Other findings: 8/21/18 Echo  Summary      Technical Quality: Limited visualization    Left ventricle ejection fraction is normal. The estimated left ventricular ejection fraction is 65%.    Normal left ventricular size.    Normal right ventricular size and systolic function.    No hemodynamically significant valvular heart abnormalities.    When compared to the previous study dated 5/11/2017, no significant change.    7/2018 device check  Type: In clinic pacemaker check. Seen with Dr. Grace.  Presenting Rhythm: Sinus, rate 60s.  Lead/Battery status: Stable lead and battery measurements.  Arrhythmias: Since 2/14/18, 38 mode switches detected, longest was 44 hours on 4/27/18, most recent episode 13.5 hours on 7/17/18, burden is 5%, vrates  during mode switch are >/=120bpm~20%, EGMs confirm AT/AF. Patient is asymptomatic with AT/AF episodes. No ventricular high rate episodes  detected.  Anticoagulant: Xarelto.  Comments: Normal device function. RA output reprogrammed from trend at 2V to trend at 1.5V. CAH         Dental    (+) upper dentures and lower dentures                         Anesthesia Plan  Planned anesthetic: general endotracheal and peripheral nerve block  50 mg ketamine IV on induction.  Right supraclavicular nerve block for post operative pain control    Phenylephrine inline for induction  2 large bore IV  Prn arterial line    Type and screen  ASA 3   Induction: intravenous   Anesthetic plan and risks discussed with: patient  Anesthesia plan special considerations: video-assisted, antiemetics, IV therapy two IVs,   Post-op plan: routine recovery

## 2021-06-21 NOTE — ANESTHESIA POSTPROCEDURE EVALUATION
Patient: Jairon Gomez  OPEN REDUCTION INTERNAL FIXATION RIGHT PROXIMAL HUMERUS FRACTURE WITH FIBULAR STRUT BONE GRAFT  Anesthesia type: general    Patient location: PACU  Last vitals:   Vitals:    10/17/18 1745   BP: 121/58   Pulse: 65   Resp: (!) 31   Temp:    SpO2: 97%     Post vital signs: stable  Level of consciousness: awake, alert and oriented  Post-anesthesia pain: pain controlled  Post-anesthesia nausea and vomiting: no  Pulmonary: unassisted, return to baseline  Cardiovascular: stable and blood pressure at baseline  Hydration: adequate  Anesthetic events: no    QCDR Measures:  ASA# 11 - Romi-op Cardiac Arrest: ASA11B - Patient did NOT experience unanticipated cardiac arrest  ASA# 12 - Romi-op Mortality Rate: ASA12B - Patient did NOT die  ASA# 13 - PACU Re-Intubation Rate: ASA13B - Patient did NOT require a new airway mgmt  ASA# 10 - Composite Anes Safety: ASA10A - No serious adverse event    Additional Notes:  Significant EBL during surgery.  Transfused with PRBC 4 units intraoperatively.  INR 1.8, surgeon notified and has requested FFP x 2 units to treat.

## 2021-06-21 NOTE — ANESTHESIA PREPROCEDURE EVALUATION
Anesthesia Evaluation      Patient summary reviewed   No history of anesthetic complications     Airway   Mallampati: III  Neck ROM: full   Pulmonary - normal exam    breath sounds clear to auscultation  (+) shortness of breath (with exertion), sleep apnea on no CPAP, ,   (-) not a smoker (Former)                         Cardiovascular   Exercise tolerance: > or = 4 METS  (+) pacemaker (Pacemaker for SSS), hypertension, dysrhythmias, CHF, , hypercholesterolemia,     (-) angina, murmur  Rhythm: irregular  Rate: normal,    no murmur   ROS comment: Both LE edematous x 2 months     Neuro/Psych    (+) dementia, chronic pain    Endo/Other    (+) diabetes mellitus type 2, arthritis, obesity (BMI),      Comments: Anemia,   Blood loss with previous surgery 9/2018 for humerus fracture    GI/Hepatic/Renal    (+) GERD,   chronic renal disease (KHOI), impaired hepatic function (alcohol abuse, EtOH cirrhosis)     Other findings: 8/21/18 Echo: The estimated left ventricular ejection fraction is 65%.  Normal left ventricular size.  Normal right ventricular size and systolic function.  No hemodynamically significant valvular heart abnormalities.  When compared to the previous study dated 5/11/2017, no significant change.    Alcoholic cirrhosis with non-bleeding esophageal varices on recent EGD, elevated BUN (from GI bleed +/-  liver dysfunction), recent GI bleed with very low BPs (one of 50/29), gout, psoriasis, prostate Ca with resection, h/o viral hepatitis, h/o hepatic encephalopathy, past h/o a-fib with RVR, BRITO.  Attending RN states pt's eyes are open due to fluid overload and scleral edema, CVP line in but pressures not being followed, has received multiple blood products.  On xarelto, DCed.  H/o pacer for SSS.  H/o H. Pylori.  Chart states CPAP was not recommended for his ADITYA.  BPs elevated, it has been recommended that they be brought down to lessen chances of bleeding.  BMI 41.  Pt is intubated, has a-line and central line,  no peripheral IVs.  Had proximal R humerus fracture repaired, not needs I & D of wound.  On fentanyl drip, pantoprazole drip.  Vent: volume controlled, 30% O2, , RR 16, PEEP +5.     Upper plate, lower partial, both out.      Dental    (+) upper dentures and lower dentures                         Anesthesia Plan  Planned anesthetic: general endotracheal    ASA 4   Induction: inhalational   Anesthetic plan and risks discussed with: spouse  Anesthesia plan special considerations: video-assisted,   Post-op plan: extended intubation/vent support

## 2021-06-21 NOTE — PROGRESS NOTES
Code Status:  FULL CODE  Visit Type: Discharge Summary and Pre-op Exam     Facility:  [facility] [[id number]]        Facility Type: SNF (Skilled Nursing Facility, TCU)    History of Present Illness: Jairon Gomez is a 73 y.o. male who I am seeing today for discharge from  the TCU as well as preop exam.  Patient with history of hx of stable cirrhosis, DM2, HTN, morbid obesity,  chronic CHF, atrial fibrillation (chronic) w recent pacer, recently hospitalized on 8/20/18 secondary to fall sustaining a right comminuted Humeral fracture with radial nerve palsy. Pt underwent ORIF on 9/11/18. He did post operative complications that included CHF, Atrial fib with tachybradycardia, acute blood loss anemia and acute encephalopathy. He continues on Oxycodone for pain. Recent xrays showed loosening of hardware. He will repeat ORIF with hardware removal on 10/17/18. He continues in splinting. He does have radial nerve palsy on he right. Chronic lymphedema.         Today pt sitting up in wheelchair. He continues in splinting.  Pain well-controlled oxycodone.  Moderate edema in the right upper extremity. With recent weeping from his forearm.  He does have edema glove in place. Continues with radial nerve palsy.  Weight is trending upward.  Up about 6 pounds.I did give additional lasix. This seems to be improving a little.  Lymphedema to the lower extremities.  Now in  Compression wraps.  Acute blood loss anemia.  Hemoglobin stable in the eights.  He does continue on iron.No evidence of bleeding.       Past Medical History:   Diagnosis Date     KHOI (acute kidney injury) (H)      Alcohol abuse, in remission      Anemia due to blood loss, acute      Ascites      Back pain     5 spurs in back     Chronic diastolic CHF (congestive heart failure) (H)      Diabetes (H)      Essential hypertension      Exertional dyspnea      Gastric ulcer 9/12/2015     GERD (gastroesophageal reflux disease)      Gout      HLD  (hyperlipidemia)      Infectious viral hepatitis     ALCOHOLIC CIRRHOSIS     Lymphedema      Obesity      Osteoarthritis      Pacemaker      PAF (paroxysmal atrial fibrillation) (H)      Prostate cancer (H)      Psoriasis      Right humeral fracture      Skull fracture and subdural hematoma 2002    as a result of loss of consciousness and fall [place] the [place]; negative workup during one week hospital stay with separate neuro and EP workup in [place] after return     Sleep apnea     no cpap recommended     SSS (sick sinus syndrome) (H)      Past Surgical History:   Procedure Laterality Date     BACK SURGERY  2011    for herniated disc, L3-L4     CARDIAC PACEMAKER PLACEMENT       CENTRAL VENOUS CATHETER INSERTION  08/24/2018    Non-tunneled - at [facility]     ESOPHAGOGASTRODUODENOSCOPY N/A 9/9/2015    Procedure: ESOPHAGOGASTRODUODENOSCOPY;  Surgeon: Karson Bauman MD;  Location: [facility] GI;  Service:      FOOT ARTHRODESIS Left 1/23/2018    Procedure: LEFT MIDFOOT FUSION WITH ALLOGRAFT;  Surgeon: Carey Ballesteros MD;  Location: [facility] OR;  Service:      WI EDG US EXAM SURGICAL ALTER STOM DUODENUM/JEJUNUM N/A 11/16/2015    Procedure: ESOPHAGOGASTRODUODENOSCOPY/ENDOSCOPIC ULTRASOUND;  Surgeon: Juan Diego Gonzalez MD;  Location: [facility];  Service: Gastroenterology     PROSTATECTOMY  1/26/2001    with bilateral lymph node dissection     TONSILLECTOMY       Family History   Problem Relation Age of Onset     Hepatitis Sister      Hepatitis C     Diabetes Brother      Heart disease Brother      Heart disease Brother      Heart disease Brother      Cancer Sister      Diabetes Sister      Alzheimer's disease Mother      Stroke Father 76     Social History     Social History     Marital status:      Spouse name: Cecilia     Number of children: N/A     Years of education: N/A     Occupational History     Not on file.     Social History Main Topics     Smoking status: Former Smoker      Smokeless tobacco: Never Used     Alcohol use No      Comment: Hx alcohol abuse- sober x 3 yrs     Drug use: No     Sexual activity: Not on file     Other Topics Concern     Not on file     Social History Narrative     Current Outpatient Prescriptions   Medication Sig Dispense Refill     acetaminophen (TYLENOL) 500 MG tablet Take 2 tablets (1,000 mg total) by mouth 3 (three) times a day.  0     allopurinol (ZYLOPRIM) 300 MG tablet Take 300 mg by mouth daily.        ascorbic acid, vitamin C, (VITAMIN C) 500 MG tablet Take 500 mg by mouth daily.       aspirin 325 MG tablet Take 1 tablet (325 mg total) by mouth 2 (two) times a day.  0     furosemide (LASIX) 20 MG tablet Take 20 mg by mouth daily.       Lactobacillus rhamnosus GG (CULTURELLE) 10-15 Billion cell capsule Take 1 capsule by mouth daily.       lisinopril (PRINIVIL,ZESTRIL) 10 MG tablet Take 2 tablets (20 mg total) by mouth daily. 60 tablet 0     magnesium oxide 500 mg Tab Take 500 mg by mouth 2 (two) times a day.       multivitamin therapeutic (THERAGRAN) tablet Take 1 tablet by mouth daily.       oxyCODONE (ROXICODONE) 5 MG immediate release tablet Take 1-2 tablets (5-10 mg total) by mouth every 4 (four) hours as needed for pain. 60 tablet 0     SALMON OIL/OMEGA-3 FATTY ACIDS (SALMON OIL-1000 ORAL) Take 1 capsule by mouth daily.        senna-docusate (PERICOLACE) 8.6-50 mg tablet Take 1 tablet by mouth 2 (two) times a day.  0     sotalol (BETAPACE) 120 MG tablet Take 1 tablet (120 mg total) by mouth 2 (two) times a day. 180 tablet 5     tamsulosin (FLOMAX) 0.4 mg cap Take 1 capsule (0.4 mg total) by mouth daily after supper. 30 capsule 0     No current facility-administered medications for this visit.      Allergies   Allergen Reactions     Lipitor [Atorvastatin] Other (See Comments)     Had GI ulcer and could have been from Lipitor vs NSAIDs     Nsaids (Non-Steroidal Anti-Inflammatory Drug) Other (See Comments)     ULCER HX       Review of Systems   No  fevers or chills. No headache, lightheadedness or dizziness. Chronic SOB with activity,  no chest pains or palpitations. Appetite is good. No nausea, vomiting, constipation or diarrhea. No dysuria, frequency, burning or pain with urination.  Pain well-controlled oxycodone.  Chronic lymphedema.  Edema to upper extremity now with weeping. Otherwise review of systems are negative      Physical Exam   PHYSICAL EXAMINATION:  Vital signs: /56, heart rate 65, respirations 22, O2 sat 90% on room air.  Current weight 309 pounds.    General: Awake, Alert, oriented x3, appropriately, follows simple commands, conversant  HEENT:PERRLA, Pink conjunctiva, anicteric sclerae, moist oral mucosa. [place] with aide.   NECK: Supple, without masses  BACK: No kyphosis of the thoracic spine  ABDOMEN: Soft, obese, nontender to palpation, with positive bowel sounds  EXTREMITIES:  Moves both upper and lower extremities with limitation on the right upper extremity, right wrist splint to right upper extremity as well as sling.  He continues with  moderate edema 2+ in the right hand.  Weeping from his forearm.  Edema sleeve on. Radial nerve palsy to right upper extremity.  Moderate lymphedema with lymphedema wraps on.    SKIN: Incision to RUE now dry.  Ecchymosis resolved.  NEUROLOGIC: Intact, pulses faint secondary to edema.  PSYCHIATRIC: Cognition intact.  Pleasant affect.        Labs:  All labs reviewed in the nursing home record.   Results for orders placed or performed in visit on 10/11/18   Basic Metabolic Panel   Result Value Ref Range    Sodium 139 136 - 145 mmol/L    Potassium 3.8 3.5 - 5.0 mmol/L    Chloride 104 98 - 107 mmol/L    CO2 29 22 - 31 mmol/L    Anion Gap, Calculation 6 5 - 18 mmol/L    Glucose 98 70 - 125 mg/dL    Calcium 8.7 8.5 - 10.5 mg/dL    BUN 12 8 - 28 mg/dL    Creatinine 0.68 (L) 0.70 - 1.30 mg/dL    GFR MDRD Af Amer >60 >60 mL/min/1.73m2    GFR MDRD Non Af Amer >60 >60 mL/min/1.73m2     Lab Results   Component  Value Date    WBC 5.4 09/25/2018    HGB 8.4 (L) 10/11/2018    HCT 24.4 (L) 09/25/2018     (H) 09/25/2018     (L) 09/25/2018     Lab Results   Component Value Date    INR 2.27 (H) 08/23/2018    INR 2.51 (H) 08/20/2018    INR 1.58 (H) 01/16/2018       Assessment and plan:  1. Humeral fracture     2. S/P ORIF (open reduction internal fixation) fracture     3. Blood loss anemia     4. Atrial fibrillation (H)     5. Diabetes mellitus (H)     6. CHF (congestive heart failure) (H)     7. Lymphedema     8. Hepatic encephalopathy (H)           S/P  ORIF of the right upper extremity secondary to humeral fracture.  Recent x-ray showed separation of the plate and screws from the distal aspect of the proximal fracture site. He will undergo surgery again on 10/17/18. Continues in splinting an edema glove.  He does have underlying CHF.  He is up about 6 pounds. I did give him additional lasix and he is down 2 lbs.  Denies any increased shortness of breath or chest pain.  Diabetes.  Satisfactory controlled.  Lymphedema. He continues in compression wraps.  Pain well-controlled with oxycodone and Tylenol.  Acute blood loss anemia.  Hemoglobin stable at 8.4.   Continues on iron.  No evidence of bleeding. He does continue on ASA. We will hold this starting 10/13. May resume after surgery. NPO after midnight on 10/16. May take meds with small sip of water on 10/17.       Pt will d/c home with current meds, treatments and narcotics. Home PT, OT, HHA and RN.     Total time spent for this visit was 60 minutes with >50% of time spent face to face with pt.       Electronically signed by: Nancy Ley CNP

## 2021-06-21 NOTE — ANESTHESIA PROCEDURE NOTES
Peripheral Block    Patient location during procedure: pre-op  Start time: 10/17/2018 12:19 PM  End time: 10/17/2018 12:25 PM  post-op analgesia per surgeon order as noted in medical record  Staffing:  Performing  Anesthesiologist: ISI SIDHU  Preanesthetic Checklist  Completed: patient identified, site marked, risks, benefits, and alternatives discussed, timeout performed, consent obtained, airway assessed, oxygen available, suction available, emergency drugs available and hand hygiene performed  Peripheral Block  Block type: brachial plexus, supraclavicular  Prep: ChloraPrep  Patient position: supine  Patient monitoring: cardiac monitor, continuous pulse oximetry, heart rate and blood pressure  Laterality: right  Injection technique: ultrasound guided    Ultrasound used to visualize needle placement in proximity to nerve being blocked: yes   Permanent ultrasound image captured for medical record  Sterile gel and probe cover used for ultrasound.    Needle  Needle type: echogenic   Needle gauge: 20G  Needle length: 4 in  no peripheral nerve catheter placed  Assessment  Injection assessment: no difficulty with injection, negative aspiration for heme, incremental injection and no paresthesia on injection

## 2021-06-21 NOTE — ANESTHESIA CARE TRANSFER NOTE
Last vitals:   Vitals:    10/17/18 1709   BP: 140/48   Pulse: 65   Resp: 18   Temp: 36.7  C (98.1  F)   SpO2: 96%     Patient's level of consciousness is drowsy  Spontaneous respirations: yes  Maintains airway independently: yes  Dentition unchanged: yes  Oropharynx: oropharynx clear of all foreign objects    QCDR Measures:  ASA# 20 - Surgical Safety Checklist: WHO surgical safety checklist completed prior to induction  PQRS# 430 - Adult PONV Prevention: 4558F - Pt received => 2 anti-emetic agents (different classes) preop & intraop  ASA# 8 - Peds PONV Prevention: NA - Not pediatric patient, not GA or 2 or more risk factors NOT present  PQRS# 424 - Romi-op Temp Management: 4559F - At least one body temp DOCUMENTED => 35.5C or 95.9F within required timeframe  PQRS# 426 - PACU Transfer Protocol: - Transfer of care checklist used  ASA# 14 - Acute Post-op Pain: ASA14B - Patient did NOT experience pain >= 7 out of 10

## 2021-06-21 NOTE — ANESTHESIA PROCEDURE NOTES
Emergent Intubation  Date/Time: 10/30/2018 10:27 PM    Performing CRNA: SICARD, SHANNON M  Indications: respiratory distress  Route: oral  Intubation method: glidescope.  Tube size: 8.0 mm  Tube type: cuffed  Cuff inflated: yes  Level of Difficulty: 0ETT to lip: 24 cm  Tube secured with: ETT eng  ETCO2 = Yes  Breath sounds: equal  SaO2 %: 100    Sign out given. CXR and sedation per primary care team.

## 2021-06-21 NOTE — ANESTHESIA POSTPROCEDURE EVALUATION
Patient: Jairon Gomez  INCISION AND DRAINAGE RIGHT ARM  Anesthesia type: general    Patient location: ICU  Last vitals:   Vitals:    11/03/18 1604   BP:    Pulse: 87   Resp:    Temp:    SpO2: 100%     Post vital signs: stable  Level of consciousness: sedated  Post-anesthesia pain: pain controlled  Post-anesthesia nausea and vomiting: no  Pulmonary: return to baseline, ETT, ventilator  Cardiovascular: stable and blood pressure at baseline  Hydration: adequate  Anesthetic events: no    QCDR Measures:  ASA# 11 - Romi-op Cardiac Arrest: ASA11B - Patient did NOT experience unanticipated cardiac arrest  ASA# 12 - Romi-op Mortality Rate: ASA12B - Patient did NOT die  ASA# 13 - PACU Re-Intubation Rate: ASA13X - Exclusion: organ donor or direct ICU transfer  ASA# 10 - Composite Anes Safety: ASA10A - No serious adverse event    Additional Notes:

## 2021-06-21 NOTE — ANESTHESIA CARE TRANSFER NOTE
Last vitals:   Vitals:    11/03/18 1421   BP: 110/46   Pulse: 90   Resp: 16   Temp: 36.7  C (98.1  F)   SpO2: 100%     Patient's level of consciousness is unresponsive  Spontaneous respirations: no: ventilatedventilated   Maintains airway independently: no: ventilated  Dentition unchanged: yes  Oropharynx: oropharynx clear of all foreign objects and endotracheal tube in place     QCDR Measures:  ASA# 20 - Surgical Safety Checklist: WHO surgical safety checklist completed prior to induction  PQRS# 430 - Adult PONV Prevention: 4558F-8P - Pt did NOT receive => 2 anti-emetic agents  ASA# 8 - Peds PONV Prevention: NA - Not pediatric patient, not GA or 2 or more risk factors NOT present  PQRS# 424 - Roim-op Temp Management: 4559F - At least one body temp DOCUMENTED => 35.5C or 95.9F within required timeframe  PQRS# 426 - PACU Transfer Protocol: - Transfer of care checklist used  ASA# 14 - Acute Post-op Pain: ASA14B - Patient did NOT experience pain >= 7 out of 10

## 2021-07-14 PROBLEM — K92.2 ACUTE UPPER GI BLEED: Status: RESOLVED | Noted: 2018-01-01 | Resolved: 2018-01-01

## 2021-07-14 PROBLEM — K92.0 HEMATEMESIS: Status: RESOLVED | Noted: 2018-01-01 | Resolved: 2018-01-01
